# Patient Record
Sex: FEMALE | Race: WHITE | NOT HISPANIC OR LATINO | Employment: FULL TIME | URBAN - METROPOLITAN AREA
[De-identification: names, ages, dates, MRNs, and addresses within clinical notes are randomized per-mention and may not be internally consistent; named-entity substitution may affect disease eponyms.]

---

## 2018-06-17 ENCOUNTER — VIRTUAL VISIT (OUTPATIENT)
Dept: FAMILY MEDICINE | Facility: OTHER | Age: 28
End: 2018-06-17

## 2018-06-18 NOTE — PROGRESS NOTES
"Date:   Clinician: Johnathan Cochran  Clinician NPI: [id number]  Patient: Luba Hill  Patient : 1990  Patient Address: [address] [place] [address]  Patient Phone: [phone]  Visit Protocol: UTI  Patient Summary:  Luba is a 28 year old ( : 1990 ) female who initiated a Visit for a presumed bladder infection. When asked the question \"Please sign me up to receive news, health information and promotions from [employer].\", Luba responded \"No\".    Her symptoms began today and consist of dysuria and hematuria.   Symptom Details   Hematuria: She has seen blood-tinged urine 4 time(s) since the onset of her symptoms. She is certain the blood is not from her vagina.    She denies flank pain, urinary frequency, recent antibiotic use, chills, foul smelling urine, nausea, hesitation, urgency, urinary incontinence, vaginal discharge, abdominal pain, vomiting, loss of appetite, and feeling feverish. Luba has never had kidney stones. She has not been hospitalized, been a patient in a nursing home, or had a catheter in the past two weeks. She denies risk factors for sexually transmitted infections.   Luba has not had a bladder infection in the past.   Luba does not get yeast infections when she takes antibiotics.   She denies pregnancy and denies breastfeeding. She has menstruated in the past month.  MEDICATIONS: [], ALLERGIES: []  Clinician Response:  Dear Luba,  Based on the information you have provided, you likely have a bladder infection, also called acute urinary tract infection (UTI). The blood in your urine appears when the infection causes irritation of the bladder or urine carrying structures.   To treat your infection, I am prescribing:    Nitrofurantoin monohyd/m-cryst (Macrobid) 100 mg oral capsule. Take 1 capsule by mouth every 12 hours for 5 days. Take the medication with food. Continue taking the capsules even if you feel better before all of the medication is " gone. There are no refills with this prescription.  Some women may develop a yeast infection as a side effect of taking antibiotics. If you notice symptoms of a yeast infection, [facility] can help treat that condition as well. Simply log in and complete another Visit, which will cover all of the necessary questions to determine the best treatment for you.   Some people develop allergies to antibiotics. If you notice a new rash, significant swelling, or difficulty breathing, stop the medication immediately and go into a clinic for physical evaluation.   If you become pregnant during this course of treatment, stop taking the medication and contact your primary care provider.   Unless you are allergic to the following over-the-counter medication, I recommend:  phenazopyridine (AZO, Uristat, or store brand) oral tablet to treat your discomfort with urination. Swallow two (2) tablets three times a day for up to 2 days. Take the pills with a full glass of water after a meal.  You will notice that this medication adds an orange/red color to your urine, which may stain fabric. Your contact lenses may also stain if handled after touching the tablets. If your skin or the whites of your eyes develop a yellowish color, it may indicate that your kidneys are not correctly removing the medication. Although this is uncommon, stop using the medication and immediately contact your clinic if this happens.  This is an over-the-counter medication that does not require a prescription.   To help treat your current UTI and prevent future occurrences, remember to:     Drink 8-10, 8-ounce glasses of water daily.    Urinate after sexual intercourse.    Wipe front to back after using the bathroom.     You should visit a clinic for a follow-up visit if your symptoms do not improve in 1-2 days or if you experience another urinary tract infection soon after completing this treatment.   Diagnosis: Acute uncomplicated bladder infection  Diagnosis ICD:  N39.0  Prescription: nitrofurantoin monohyd/m-cryst (Macrobid) 100 mg oral capsule 10 capsule, 5 days supply. Take 1 capsule by mouth every 12 hours for 5 days. Refills: 0, Refill as needed: no, Allow substitutions: yes  Pharmacy: [id number] - [phone] - [address] [place] [address]

## 2018-11-09 ENCOUNTER — VIRTUAL VISIT (OUTPATIENT)
Dept: FAMILY MEDICINE | Facility: OTHER | Age: 28
End: 2018-11-09

## 2018-11-09 NOTE — PROGRESS NOTES
"Date:   Clinician: Johnathan Cochran  Clinician NPI: [id number]  Patient: Luba Hill  Patient : 1990  Patient Address: [address] [place] [id number]  Patient Phone: [phone]  Visit Protocol: Shingles  Patient Summary:  Luba is a 28 year old ( : 1990 ) female who initiated a Visit for suspected Herpes zoster (shingles). When asked the question \"Please sign me up to receive news, health information and promotions from [employer].\", Luba responded \"Yes\".    Images of her skin condition were uploaded.   Her symptoms started 1-3 days ago. The left side of her body is affected. The rash is located on her abdomen and back. The rash is red and includes scaly skin and blisters.   The affected area feels like it burns, painful, itchy, warm to touch, and tender to touch. The symptoms do not interfere with her sleep. Luba experienced pain or unusual sensations in the location of the rash before it appeared.   Symptom details     Blisters: Luba has only a few blisters.    Pain: The pain is mild (between 1-3 on a 10 point pain scale).     Denied symptoms include crusts, dry skin, flaky skin, sores, numbness, drainage, and scabs. Luba does not feel feverish.   Pertinent medical history  Luba has had chickenpox, but has not had shingles in the past.   Ongoing medical conditions were denied.   She denies pregnancy and denies breastfeeding. She has menstruated in the past month.   Luba does not smoke or use smokeless tobacco.   Additional information as reported by the patient (free text): On Wednesday  I had very sharp and localized pain on my back/ribs on the left side which felt like a very deep and large bruise, but there was nothing showing on my skin. I assumed that I had pulled a muscle, or somehow bumped it. On Thursday I woke up and the spot on my back/stomach was even more tender and had sharp shooting pain, but I still did not see any spots/blisters. Now I have " burning/itching blisters/spots from my ribs/back to chest on L side only and it is very sensitive to touch   MEDICATIONS: No current medications, ALLERGIES: NKDA  Clinician Response:  Dear Luba,  Based on the information provided, you have Herpes Zoster (shingles). Shingles is a blistered rash caused by the same virus that causes chickenpox - Varicella Zoster.  Everyone who comes down with shingles has had chickenpox at some time in their life. After recovering from chickenpox, the inactive virus remains in the nerve cells. Shingles develops if the virus becomes active. This reactivation can happen years or even decades later.   Because the virus spreads along a nerve, the rash is painful and appears as a stripe along one side of the body. The rash contains groups of blisters that break, crust over, and resolve within 3-6 weeks. Although rare, it is possible for the pain to last weeks after the rash has completely healed.  Medication information  I am prescribing:   Valacyclovir (Valtrex) 1 gram oral tablet. Take 1 tablet by mouth every 8 hours for 7 days. There are no refills with this prescription.  Medication is used to help speed up the healing process, but may not take your symptoms away completely.  Unless you are allergic to the over-the-counter medication(s) below, I recommend using:   Ibuprofen (Advil or store brand) 200 mg oral tablet. Take 1-3 tablets (200-600 mg) by mouth every 8 hours to control pain. Make sure to take the ibuprofen with food. Do not exceed 2400 mg in 24 hours.  Over-the-counter medications do not require a prescription. Ask the pharmacist if you have any questions.  Self care  Shingles is not spread from one person to another. However, because shingles and chickenpox are caused by the same virus, you could spread chickenpox to someone who has not had the illness or been vaccinated against it. Cover the rash with a loose bandage until all blisters scab over to prevent spreading the  virus to those at risk for getting chickenpox.  Steps you can take to be as comfortable as possible:     Avoid touching the rash    Apply a cool, wet washcloth to your rash for 15 minutes several times a day    Take a lukewarm bath to soothe the skin. Adding colloidal oatmeal can help even more    Choose clothing and bedding made of a breathable material like cotton    Do not use antibiotic creams or ointments unless recommended by a provider     When to seek care  Please make an appointment to be seen in a clinic or go to an urgent care if any of the following occur:     Your rash doesn't improve after 14 days    You develop new symptoms or your symptoms become worse    Symptoms are so severe that you are unable to sleep or do regular activities    You are still experiencing pain one month after your shingles rash has completely healed    You notice symptoms of a skin infection (spreading redness, pain that is not improving, fevers, warmth)     Seek care in an emergency room if you develop a fever, headache and sensitivity to light.   Diagnosis: Herpes zoster (shingles)  Diagnosis ICD: B02.8  Prescription: valacyclovir (Valtrex) 1 gram oral tablet 21 tablet, 7 days supply. Take 1 tablet by mouth every 8 hours for 7 days. Refills: 0, Refill as needed: no, Allow substitutions: yes  Pharmacy: [id number] - [phone] - [address], [place] [address]

## 2019-12-28 ENCOUNTER — VIRTUAL VISIT (OUTPATIENT)
Dept: FAMILY MEDICINE | Facility: OTHER | Age: 29
End: 2019-12-28

## 2020-05-20 ENCOUNTER — NURSE TRIAGE (OUTPATIENT)
Dept: NURSING | Facility: CLINIC | Age: 30
End: 2020-05-20

## 2020-05-20 DIAGNOSIS — Z20.822 EXPOSURE TO COVID-19 VIRUS: Primary | ICD-10-CM

## 2020-05-20 NOTE — TELEPHONE ENCOUNTER
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 Initial exposure approx 6 weeks ago, prior to PPE usage.  Luba reports never having developed symptoms.  Madeline testing ordered.         Patient is calling requesting COVID serologic antibody testing.  NOTE: Serologic testing is a blood test for 'antibodies' which are made at 10-14 days after you have had symptoms of COVID or were exposed and had an asymptomatic infection.  This does NOT test you for 'active' infection or tell you if you are contagious.    Are you a healthcare worker?  Yes  Do you work for [employer]?   No  Do you currently have a cough, fever, body aches, shortness of breath, or difficulty breathing?  No  Have you been exposed to (or come into close contact with) someone who tested POSITIVE for COVID-19 or someone who had a possible case of COVID-19?  Confirmed exposure 42 days ago.  Confirmed exposure > 14 days ago.      The patient was informed: \"Testing is limited each day and it may take time for testing to be available to everyone who has called.  We will be calling you to schedule testing- please confirm the best number to reach you is [phone].\"    Lab order placed per SARS-CoV-2 Serology test Standing Order.      {    Reason for Disposition    COVID-19 Testing, questions about    Protocols used: CORONAVIRUS (COVID-19) EXPOSURE-A- 4.22.20      "

## 2020-05-22 DIAGNOSIS — Z20.822 EXPOSURE TO COVID-19 VIRUS: ICD-10-CM

## 2020-05-22 PROCEDURE — 36415 COLL VENOUS BLD VENIPUNCTURE: CPT | Performed by: EMERGENCY MEDICINE

## 2020-05-22 PROCEDURE — 99000 SPECIMEN HANDLING OFFICE-LAB: CPT | Performed by: EMERGENCY MEDICINE

## 2020-05-22 PROCEDURE — 86769 SARS-COV-2 COVID-19 ANTIBODY: CPT | Mod: 90 | Performed by: EMERGENCY MEDICINE

## 2020-05-23 LAB
COVID-19 SPIKE RBD ABY TITER: NORMAL
COVID-19 SPIKE RBD ABY: NEGATIVE

## 2021-01-03 ENCOUNTER — HEALTH MAINTENANCE LETTER (OUTPATIENT)
Age: 31
End: 2021-01-03

## 2021-10-10 ENCOUNTER — HEALTH MAINTENANCE LETTER (OUTPATIENT)
Age: 31
End: 2021-10-10

## 2022-01-29 ENCOUNTER — HEALTH MAINTENANCE LETTER (OUTPATIENT)
Age: 32
End: 2022-01-29

## 2022-09-18 ENCOUNTER — HEALTH MAINTENANCE LETTER (OUTPATIENT)
Age: 32
End: 2022-09-18

## 2023-05-06 ENCOUNTER — HEALTH MAINTENANCE LETTER (OUTPATIENT)
Age: 33
End: 2023-05-06

## 2024-02-19 LAB
HEPATITIS B SURFACE ANTIGEN (EXTERNAL): NEGATIVE
HIV1+2 AB SERPL QL IA: NEGATIVE
RUBELLA ANTIBODY IGG (EXTERNAL): POSITIVE

## 2024-02-28 ENCOUNTER — PATIENT OUTREACH (OUTPATIENT)
Dept: CARE COORDINATION | Facility: CLINIC | Age: 34
End: 2024-02-28

## 2024-03-13 ENCOUNTER — PATIENT OUTREACH (OUTPATIENT)
Dept: CARE COORDINATION | Facility: CLINIC | Age: 34
End: 2024-03-13

## 2024-05-01 ENCOUNTER — OFFICE VISIT (OUTPATIENT)
Dept: URGENT CARE | Facility: URGENT CARE | Age: 34
End: 2024-05-01
Payer: COMMERCIAL

## 2024-05-01 VITALS
DIASTOLIC BLOOD PRESSURE: 81 MMHG | BODY MASS INDEX: 33.9 KG/M2 | SYSTOLIC BLOOD PRESSURE: 121 MMHG | HEIGHT: 67 IN | RESPIRATION RATE: 16 BRPM | OXYGEN SATURATION: 98 % | TEMPERATURE: 97.9 F | WEIGHT: 216 LBS | HEART RATE: 77 BPM

## 2024-05-01 DIAGNOSIS — Z3A.18 18 WEEKS GESTATION OF PREGNANCY: ICD-10-CM

## 2024-05-01 DIAGNOSIS — J06.9 VIRAL URI WITH COUGH: Primary | ICD-10-CM

## 2024-05-01 PROCEDURE — 99203 OFFICE O/P NEW LOW 30 MIN: CPT | Performed by: NURSE PRACTITIONER

## 2024-05-01 NOTE — PATIENT INSTRUCTIONS
Drink plenty of water.  Tylenol as needed for any discomfort or pain.    Follow-up with ultrasound appointment next week as scheduled.

## 2024-05-01 NOTE — PROGRESS NOTES
"  ICD-10-CM    1. Viral URI with cough  J06.9       Tylenol as needed for discomfort. Rest.  Fluids.  Recheck in 10 days if symptoms have not improved, sooner if they worsen.    Red flag warning signs and when to go to the emergency room discussed.  Reviewed potential adverse reactions to medications.    SUBJECTIVE:   Luba Hill is a 34 year old female presenting with a chief complaint of   Chief Complaint   Patient presents with    Ear Problem     This morning when blowing nose has a loud pop in right ear, now hard to hear out of     Nasal Congestion     X1 week of congestion     Cough    Urgent Care     Negative covid tests and no fever    Sore throat, IUP 18 weeks and 3 days.    Review of systems is negative except for as noted in the HPI.    OBJECTIVE  /81   Pulse 77   Temp 97.9  F (36.6  C) (Temporal)   Resp 16   Ht 1.702 m (5' 7\")   Wt 98 kg (216 lb)   SpO2 98%   BMI 33.83 kg/m        GENERAL: Alert, mild distress  SKIN: skin is clear, no rash or abnormal pigmentation  HEAD: The head is normocephalic.   EYES: The eyes are normal. The conjunctivae and cornea normal.   NECK: The neck is supple and thyroid is normal, no masses; LYMPH NODES: No adenopathy  HENT: Bilateral tympanic membranes appear normal, ear canals are normal, clear rhinorrhea is present, pharynx appears normal  LUNGS: The lung fields are clear to auscultation, no rales, rhonchi, wheezing or retractions  CV: Rhythm is regular. S1 and S2 are normal. No murmurs.  EXTREMITIES: Symmetric extremities no deformities    Eda Medina APRN, CNP  [facility] Provider    The use of Dragon/[employer] dictation services may have been used to construct the content in this note; any grammatical or spelling errors are non-intentional. Please contact the author of this note directly if you are in need of any clarification.       "

## 2024-05-05 ENCOUNTER — HEALTH MAINTENANCE LETTER (OUTPATIENT)
Age: 34
End: 2024-05-05

## 2024-07-03 LAB — TREPONEMA PALLIDUM ANTIBODY (EXTERNAL): NONREACTIVE

## 2024-09-04 LAB — GROUP B STREPTOCOCCUS (EXTERNAL): NEGATIVE

## 2024-10-02 ENCOUNTER — ANESTHESIA EVENT (OUTPATIENT)
Dept: OBGYN | Facility: CLINIC | Age: 34
End: 2024-10-02
Payer: COMMERCIAL

## 2024-10-02 ENCOUNTER — HOSPITAL ENCOUNTER (INPATIENT)
Facility: CLINIC | Age: 34
LOS: 2 days | Discharge: HOME | End: 2024-10-04
Attending: SPECIALIST | Admitting: SPECIALIST
Payer: COMMERCIAL

## 2024-10-02 ENCOUNTER — ANESTHESIA (OUTPATIENT)
Dept: OBGYN | Facility: CLINIC | Age: 34
End: 2024-10-02
Payer: COMMERCIAL

## 2024-10-02 ENCOUNTER — HOSPITAL ENCOUNTER (OUTPATIENT)
Facility: CLINIC | Age: 34
Discharge: HOME | End: 2024-10-02
Attending: SPECIALIST | Admitting: SPECIALIST
Payer: COMMERCIAL

## 2024-10-02 VITALS — TEMPERATURE: 97.2 F | RESPIRATION RATE: 16 BRPM | SYSTOLIC BLOOD PRESSURE: 125 MMHG | DIASTOLIC BLOOD PRESSURE: 62 MMHG

## 2024-10-02 PROBLEM — Z36.89 ENCOUNTER FOR TRIAGE IN PREGNANT PATIENT: Status: ACTIVE | Noted: 2024-10-02

## 2024-10-02 LAB
ABO/RH(D): NORMAL
ANTIBODY SCREEN: NEGATIVE
HGB BLD-MCNC: 11.5 G/DL (ref 11.7–15.7)
SPECIMEN EXPIRATION DATE: NORMAL
T PALLIDUM AB SER QL: NONREACTIVE

## 2024-10-02 PROCEDURE — 120N000001 HC R&B MED SURG/OB

## 2024-10-02 PROCEDURE — 250N000009 HC RX 250: Performed by: SPECIALIST

## 2024-10-02 PROCEDURE — 250N000011 HC RX IP 250 OP 636: Performed by: ANESTHESIOLOGY

## 2024-10-02 PROCEDURE — 10907ZC DRAINAGE OF AMNIOTIC FLUID, THERAPEUTIC FROM PRODUCTS OF CONCEPTION, VIA NATURAL OR ARTIFICIAL OPENING: ICD-10-PCS | Performed by: SPECIALIST

## 2024-10-02 PROCEDURE — 722N000001 HC LABOR CARE VAGINAL DELIVERY SINGLE

## 2024-10-02 PROCEDURE — 36415 COLL VENOUS BLD VENIPUNCTURE: CPT | Performed by: SPECIALIST

## 2024-10-02 PROCEDURE — 0UQMXZZ REPAIR VULVA, EXTERNAL APPROACH: ICD-10-PCS | Performed by: SPECIALIST

## 2024-10-02 PROCEDURE — 258N000003 HC RX IP 258 OP 636: Performed by: SPECIALIST

## 2024-10-02 PROCEDURE — 00HU33Z INSERTION OF INFUSION DEVICE INTO SPINAL CANAL, PERCUTANEOUS APPROACH: ICD-10-PCS | Performed by: ANESTHESIOLOGY

## 2024-10-02 PROCEDURE — 250N000013 HC RX MED GY IP 250 OP 250 PS 637: Performed by: SPECIALIST

## 2024-10-02 PROCEDURE — G0463 HOSPITAL OUTPT CLINIC VISIT: HCPCS

## 2024-10-02 PROCEDURE — 59400 OBSTETRICAL CARE: CPT | Performed by: ANESTHESIOLOGY

## 2024-10-02 PROCEDURE — 3E0R3BZ INTRODUCTION OF ANESTHETIC AGENT INTO SPINAL CANAL, PERCUTANEOUS APPROACH: ICD-10-PCS | Performed by: ANESTHESIOLOGY

## 2024-10-02 PROCEDURE — 85018 HEMOGLOBIN: CPT | Performed by: SPECIALIST

## 2024-10-02 PROCEDURE — 86900 BLOOD TYPING SEROLOGIC ABO: CPT | Performed by: SPECIALIST

## 2024-10-02 PROCEDURE — 86780 TREPONEMA PALLIDUM: CPT | Performed by: SPECIALIST

## 2024-10-02 PROCEDURE — 370N000003 HC ANESTHESIA WARD SERVICE: Performed by: ANESTHESIOLOGY

## 2024-10-02 PROCEDURE — 86850 RBC ANTIBODY SCREEN: CPT | Performed by: SPECIALIST

## 2024-10-02 RX ORDER — NALOXONE HYDROCHLORIDE 0.4 MG/ML
0.4 INJECTION, SOLUTION INTRAMUSCULAR; INTRAVENOUS; SUBCUTANEOUS
Status: DISCONTINUED | OUTPATIENT
Start: 2024-10-02 | End: 2024-10-02 | Stop reason: HOSPADM

## 2024-10-02 RX ORDER — ASPIRIN 81 MG/1
81 TABLET ORAL DAILY
Status: ON HOLD | COMMUNITY
End: 2024-10-04

## 2024-10-02 RX ORDER — LOPERAMIDE HYDROCHLORIDE 2 MG/1
2 CAPSULE ORAL
Status: DISCONTINUED | OUTPATIENT
Start: 2024-10-02 | End: 2024-10-04 | Stop reason: HOSPADM

## 2024-10-02 RX ORDER — ONDANSETRON 2 MG/ML
4 INJECTION INTRAMUSCULAR; INTRAVENOUS EVERY 6 HOURS PRN
Status: DISCONTINUED | OUTPATIENT
Start: 2024-10-02 | End: 2024-10-02 | Stop reason: HOSPADM

## 2024-10-02 RX ORDER — CITRIC ACID/SODIUM CITRATE 334-500MG
30 SOLUTION, ORAL ORAL
Status: DISCONTINUED | OUTPATIENT
Start: 2024-10-02 | End: 2024-10-02 | Stop reason: HOSPADM

## 2024-10-02 RX ORDER — HYDROXYZINE HYDROCHLORIDE 50 MG/1
50 TABLET, FILM COATED ORAL ONCE
Status: COMPLETED | OUTPATIENT
Start: 2024-10-02 | End: 2024-10-02

## 2024-10-02 RX ORDER — MISOPROSTOL 200 UG/1
400 TABLET ORAL
Status: DISCONTINUED | OUTPATIENT
Start: 2024-10-02 | End: 2024-10-04 | Stop reason: HOSPADM

## 2024-10-02 RX ORDER — TRANEXAMIC ACID 10 MG/ML
1 INJECTION, SOLUTION INTRAVENOUS EVERY 30 MIN PRN
Status: DISCONTINUED | OUTPATIENT
Start: 2024-10-02 | End: 2024-10-04 | Stop reason: HOSPADM

## 2024-10-02 RX ORDER — MISOPROSTOL 200 UG/1
400 TABLET ORAL
Status: DISCONTINUED | OUTPATIENT
Start: 2024-10-02 | End: 2024-10-02 | Stop reason: HOSPADM

## 2024-10-02 RX ORDER — METHYLERGONOVINE MALEATE 0.2 MG/ML
200 INJECTION INTRAVENOUS
Status: DISCONTINUED | OUTPATIENT
Start: 2024-10-02 | End: 2024-10-02 | Stop reason: HOSPADM

## 2024-10-02 RX ORDER — ROPIVACAINE HYDROCHLORIDE 2 MG/ML
10 INJECTION, SOLUTION EPIDURAL; INFILTRATION; PERINEURAL ONCE
Status: DISCONTINUED | OUTPATIENT
Start: 2024-10-02 | End: 2024-10-02 | Stop reason: HOSPADM

## 2024-10-02 RX ORDER — METHYLERGONOVINE MALEATE 0.2 MG/ML
200 INJECTION INTRAVENOUS
Status: DISCONTINUED | OUTPATIENT
Start: 2024-10-02 | End: 2024-10-04 | Stop reason: HOSPADM

## 2024-10-02 RX ORDER — IBUPROFEN 400 MG/1
800 TABLET, FILM COATED ORAL
Status: COMPLETED | OUTPATIENT
Start: 2024-10-02 | End: 2024-10-03

## 2024-10-02 RX ORDER — OXYTOCIN/0.9 % SODIUM CHLORIDE 30/500 ML
340 PLASTIC BAG, INJECTION (ML) INTRAVENOUS CONTINUOUS PRN
Status: DISCONTINUED | OUTPATIENT
Start: 2024-10-02 | End: 2024-10-04 | Stop reason: HOSPADM

## 2024-10-02 RX ORDER — HYDROCORTISONE 25 MG/G
CREAM TOPICAL 3 TIMES DAILY PRN
Status: DISCONTINUED | OUTPATIENT
Start: 2024-10-02 | End: 2024-10-04 | Stop reason: HOSPADM

## 2024-10-02 RX ORDER — METOCLOPRAMIDE 10 MG/1
10 TABLET ORAL EVERY 6 HOURS PRN
Status: DISCONTINUED | OUTPATIENT
Start: 2024-10-02 | End: 2024-10-02 | Stop reason: HOSPADM

## 2024-10-02 RX ORDER — ONDANSETRON 4 MG/1
4 TABLET, ORALLY DISINTEGRATING ORAL EVERY 6 HOURS PRN
Status: DISCONTINUED | OUTPATIENT
Start: 2024-10-02 | End: 2024-10-02 | Stop reason: HOSPADM

## 2024-10-02 RX ORDER — LOPERAMIDE HYDROCHLORIDE 2 MG/1
2 CAPSULE ORAL
Status: DISCONTINUED | OUTPATIENT
Start: 2024-10-02 | End: 2024-10-02 | Stop reason: HOSPADM

## 2024-10-02 RX ORDER — MISOPROSTOL 200 UG/1
800 TABLET ORAL
Status: DISCONTINUED | OUTPATIENT
Start: 2024-10-02 | End: 2024-10-04 | Stop reason: HOSPADM

## 2024-10-02 RX ORDER — OXYTOCIN/0.9 % SODIUM CHLORIDE 30/500 ML
1-24 PLASTIC BAG, INJECTION (ML) INTRAVENOUS CONTINUOUS
Status: DISCONTINUED | OUTPATIENT
Start: 2024-10-02 | End: 2024-10-02 | Stop reason: HOSPADM

## 2024-10-02 RX ORDER — LIDOCAINE 40 MG/G
CREAM TOPICAL
Status: DISCONTINUED | OUTPATIENT
Start: 2024-10-02 | End: 2024-10-02 | Stop reason: HOSPADM

## 2024-10-02 RX ORDER — LOPERAMIDE HYDROCHLORIDE 2 MG/1
4 CAPSULE ORAL
Status: DISCONTINUED | OUTPATIENT
Start: 2024-10-02 | End: 2024-10-04 | Stop reason: HOSPADM

## 2024-10-02 RX ORDER — KETOROLAC TROMETHAMINE 30 MG/ML
30 INJECTION, SOLUTION INTRAMUSCULAR; INTRAVENOUS
Status: COMPLETED | OUTPATIENT
Start: 2024-10-02 | End: 2024-10-03

## 2024-10-02 RX ORDER — PROCHLORPERAZINE MALEATE 5 MG/1
10 TABLET ORAL EVERY 6 HOURS PRN
Status: DISCONTINUED | OUTPATIENT
Start: 2024-10-02 | End: 2024-10-02 | Stop reason: HOSPADM

## 2024-10-02 RX ORDER — OXYTOCIN 10 [USP'U]/ML
10 INJECTION, SOLUTION INTRAMUSCULAR; INTRAVENOUS
Status: DISCONTINUED | OUTPATIENT
Start: 2024-10-02 | End: 2024-10-04 | Stop reason: HOSPADM

## 2024-10-02 RX ORDER — CARBOPROST TROMETHAMINE 250 UG/ML
250 INJECTION, SOLUTION INTRAMUSCULAR
Status: DISCONTINUED | OUTPATIENT
Start: 2024-10-02 | End: 2024-10-02 | Stop reason: HOSPADM

## 2024-10-02 RX ORDER — HYDROXYZINE HYDROCHLORIDE 50 MG/1
TABLET, FILM COATED ORAL
Status: COMPLETED
Start: 2024-10-02 | End: 2024-10-02

## 2024-10-02 RX ORDER — ROPIVACAINE HYDROCHLORIDE 2 MG/ML
INJECTION, SOLUTION EPIDURAL; INFILTRATION; PERINEURAL
Status: COMPLETED | OUTPATIENT
Start: 2024-10-02 | End: 2024-10-02

## 2024-10-02 RX ORDER — MISOPROSTOL 200 UG/1
800 TABLET ORAL
Status: DISCONTINUED | OUTPATIENT
Start: 2024-10-02 | End: 2024-10-02 | Stop reason: HOSPADM

## 2024-10-02 RX ORDER — TRANEXAMIC ACID 10 MG/ML
1 INJECTION, SOLUTION INTRAVENOUS EVERY 30 MIN PRN
Status: DISCONTINUED | OUTPATIENT
Start: 2024-10-02 | End: 2024-10-02 | Stop reason: HOSPADM

## 2024-10-02 RX ORDER — IBUPROFEN 400 MG/1
800 TABLET, FILM COATED ORAL EVERY 6 HOURS PRN
Status: DISCONTINUED | OUTPATIENT
Start: 2024-10-02 | End: 2024-10-04 | Stop reason: HOSPADM

## 2024-10-02 RX ORDER — SODIUM CHLORIDE, SODIUM LACTATE, POTASSIUM CHLORIDE, CALCIUM CHLORIDE 600; 310; 30; 20 MG/100ML; MG/100ML; MG/100ML; MG/100ML
INJECTION, SOLUTION INTRAVENOUS CONTINUOUS
Status: DISCONTINUED | OUTPATIENT
Start: 2024-10-02 | End: 2024-10-02 | Stop reason: HOSPADM

## 2024-10-02 RX ORDER — OXYTOCIN/0.9 % SODIUM CHLORIDE 30/500 ML
100-340 PLASTIC BAG, INJECTION (ML) INTRAVENOUS CONTINUOUS PRN
Status: DISCONTINUED | OUTPATIENT
Start: 2024-10-02 | End: 2024-10-04 | Stop reason: HOSPADM

## 2024-10-02 RX ORDER — LOPERAMIDE HYDROCHLORIDE 2 MG/1
4 CAPSULE ORAL
Status: DISCONTINUED | OUTPATIENT
Start: 2024-10-02 | End: 2024-10-02 | Stop reason: HOSPADM

## 2024-10-02 RX ORDER — NALOXONE HYDROCHLORIDE 0.4 MG/ML
0.2 INJECTION, SOLUTION INTRAMUSCULAR; INTRAVENOUS; SUBCUTANEOUS
Status: DISCONTINUED | OUTPATIENT
Start: 2024-10-02 | End: 2024-10-02 | Stop reason: HOSPADM

## 2024-10-02 RX ORDER — CARBOPROST TROMETHAMINE 250 UG/ML
250 INJECTION, SOLUTION INTRAMUSCULAR
Status: DISCONTINUED | OUTPATIENT
Start: 2024-10-02 | End: 2024-10-04 | Stop reason: HOSPADM

## 2024-10-02 RX ORDER — NALBUPHINE HYDROCHLORIDE 10 MG/ML
2.5-5 INJECTION INTRAMUSCULAR; INTRAVENOUS; SUBCUTANEOUS EVERY 6 HOURS PRN
Status: DISCONTINUED | OUTPATIENT
Start: 2024-10-02 | End: 2024-10-04 | Stop reason: HOSPADM

## 2024-10-02 RX ORDER — METOCLOPRAMIDE HYDROCHLORIDE 5 MG/ML
10 INJECTION INTRAMUSCULAR; INTRAVENOUS EVERY 6 HOURS PRN
Status: DISCONTINUED | OUTPATIENT
Start: 2024-10-02 | End: 2024-10-02 | Stop reason: HOSPADM

## 2024-10-02 RX ORDER — MODIFIED LANOLIN
OINTMENT (GRAM) TOPICAL
Status: DISCONTINUED | OUTPATIENT
Start: 2024-10-02 | End: 2024-10-04 | Stop reason: HOSPADM

## 2024-10-02 RX ORDER — EPHEDRINE SULFATE 50 MG/ML
5 INJECTION, SOLUTION INTRAMUSCULAR; INTRAVENOUS; SUBCUTANEOUS
Status: DISCONTINUED | OUTPATIENT
Start: 2024-10-02 | End: 2024-10-02 | Stop reason: HOSPADM

## 2024-10-02 RX ORDER — DOCUSATE SODIUM 100 MG/1
100 CAPSULE, LIQUID FILLED ORAL DAILY
Status: DISCONTINUED | OUTPATIENT
Start: 2024-10-03 | End: 2024-10-04 | Stop reason: HOSPADM

## 2024-10-02 RX ORDER — BISACODYL 10 MG
10 SUPPOSITORY, RECTAL RECTAL DAILY PRN
Status: DISCONTINUED | OUTPATIENT
Start: 2024-10-02 | End: 2024-10-04 | Stop reason: HOSPADM

## 2024-10-02 RX ORDER — OXYTOCIN 10 [USP'U]/ML
10 INJECTION, SOLUTION INTRAMUSCULAR; INTRAVENOUS
Status: DISCONTINUED | OUTPATIENT
Start: 2024-10-02 | End: 2024-10-02 | Stop reason: HOSPADM

## 2024-10-02 RX ORDER — ACETAMINOPHEN 325 MG/1
650 TABLET ORAL EVERY 4 HOURS PRN
Status: DISCONTINUED | OUTPATIENT
Start: 2024-10-02 | End: 2024-10-04 | Stop reason: HOSPADM

## 2024-10-02 RX ORDER — OXYTOCIN/0.9 % SODIUM CHLORIDE 30/500 ML
340 PLASTIC BAG, INJECTION (ML) INTRAVENOUS CONTINUOUS PRN
Status: DISCONTINUED | OUTPATIENT
Start: 2024-10-02 | End: 2024-10-02 | Stop reason: HOSPADM

## 2024-10-02 RX ADMIN — HYDROXYZINE HYDROCHLORIDE 50 MG: 50 TABLET, FILM COATED ORAL at 08:36

## 2024-10-02 RX ADMIN — SODIUM CHLORIDE, POTASSIUM CHLORIDE, SODIUM LACTATE AND CALCIUM CHLORIDE 100 ML/HR: 600; 310; 30; 20 INJECTION, SOLUTION INTRAVENOUS at 14:43

## 2024-10-02 RX ADMIN — Medication 2 MILLI-UNITS/MIN: at 18:08

## 2024-10-02 RX ADMIN — ROPIVACAINE HYDROCHLORIDE 10 ML: 2 INJECTION, SOLUTION EPIDURAL; INFILTRATION at 17:15

## 2024-10-02 RX ADMIN — SODIUM CHLORIDE, POTASSIUM CHLORIDE, SODIUM LACTATE AND CALCIUM CHLORIDE: 600; 310; 30; 20 INJECTION, SOLUTION INTRAVENOUS at 17:35

## 2024-10-02 RX ADMIN — Medication: at 17:12

## 2024-10-02 ASSESSMENT — ACTIVITIES OF DAILY LIVING (ADL)
ADLS_ACUITY_SCORE: 18
ADLS_ACUITY_SCORE: 19
ADLS_ACUITY_SCORE: 18
ADLS_ACUITY_SCORE: 19
ADLS_ACUITY_SCORE: 18
ADLS_ACUITY_SCORE: 19
ADLS_ACUITY_SCORE: 18
ADLS_ACUITY_SCORE: 18
ADLS_ACUITY_SCORE: 35
ADLS_ACUITY_SCORE: 18

## 2024-10-02 NOTE — PROVIDER NOTIFICATION
10/02/24 0805   Provider Notification   Provider Name/Title Dr Tavarez   Method of Notification Electronic Page   Request Evaluate - Remote   Notification Reason SVE;Status Update     Phone call to Dr Tavarez, discussed maternal/fetal status.  Discussed ctx, FHR, vitals and SVE.  Orders given for vistaril for sleep and to discharge home.

## 2024-10-02 NOTE — ANESTHESIA PROCEDURE NOTES
"Epidural catheter Procedure Note    Pre-Procedure   Staff -        Anesthesiologist:  Shoaib Benito MD       Performed By: anesthesiologist       Location: OB       Pre-Anesthestic Checklist: patient identified, IV checked, risks and benefits discussed, informed consent, monitors and equipment checked, pre-op evaluation and at physician/surgeon's request  Timeout:       Correct Patient: Yes        Correct Procedure: Yes        Correct Site: Yes        Correct Position: Yes   Procedure Documentation  Procedure: epidural catheter       Patient Position: sitting       Patient Prep/Sterile Barriers: sterile gloves, mask, patient draped       Skin prep: Betadine and DuraPrep       Local skin infiltrated with mL of 1% lidocaine.        Insertion Site: L2-3. (midline approach).       Technique: LORT saline        ADINA at 5 cm.       Needle Type: Mobivoxy needle       Needle Gauge: 17.        Needle Length (Inches): 5        Catheter: 19 G.          Catheter threaded easily.         5 cm epidural space.         Threaded 10 cm at skin.         # of attempts: 1 and  # of redirects:          : 0.    Assessment/Narrative         Paresthesias: No.       Test dose of 3 mL lidocaine 1.5% w/ 1:200,000 epinephrine at.         Test dose negative, 3 minutes after injection, for signs of intravascular, subdural, or intrathecal injection.       Insertion/Infusion Method: LORT saline       No aspiration negative for Heme or CSF via Epidural Catheter.    Medication(s) Administered   0.2% Ropivacaine (Epidural) - EPIDURAL   10 mL - 10/2/2024 5:15:00 PM   Comments:  Pt tolerated well.    Taped sterile and secure.   FHTs stable post-procedure.   No complications.       FOR [facility] ([facility] ONLY:   Pain Team Contact information: please page the Pain Team Via [employer]. Search \"Pain\". During daytime hours, please page the attending first. At night please page the resident first.      "

## 2024-10-02 NOTE — PLAN OF CARE
1350.  Primip, 40 3/7 gestation, here from home after having been seen in clinic this morning by  and had cervical change.  External monitors applied with pts consent, health history verified.   Pt rates contractions 2/10, and irregular at this time.    1415.  Page to  for orders.  1420.   at bedside, SVE performed, 4/80/0 station, SROM with exam, clear fluid noted.     1510.  Report to Luba Harris RN to assume pt cares

## 2024-10-02 NOTE — PROGRESS NOTES
Data: Patient presented to [facility]: 10/2/2024  6:51 AM.  Reason for maternal/fetal assessment is uterine contractions. Patient reports contractions every 5 minutes and increasing in intensity. Patient denies leaking of vaginal fluid/rupture of membranes, vaginal bleeding. Patient reports fetal movement is normal. Patient is a 40w3d .  Prenatal record reviewed. Pregnancy has been uncomplicated.    Vital signs wnl. Support person is present.     Action: Verbal consent for EFM. Triage assessment completed.     Handoff report given to MARY ANN Osei

## 2024-10-02 NOTE — H&P
"OB Admit H&P  Prenatal record reviewed   Primary Dr Emerald Paky is a 34 yo G1 with EDC 2024 therefore 40  3/7 weeks who was here in prodromal labor then went to office and then home and had changed her cervix. She is still not uncomfortable at this point. Pregnancy unremarkable. GBBS was negative.    Past medical Hx hx of pyloric stenosis   Hx of PCOS    Had vaccines flu, tdap and RSV    PE /71   Temp 99  F (37.2  C) (Temporal)   Ht 1.702 m (5' 7\")   BMI 33.83 kg/m     Patient sitting up in bed in no distress  Cv RRR  Lungs clear  Abdomen gravid  SVE 4cm/80/0  AROM with exam  FHT's cat 1   Uc's irregular    A/P Primigravida in early labor  now ruptured which should augment her.  Admission orders in will rest for now. Anticipate .     Electronically signed by Juliana Tavarez MD 2:38 PM 10/2/2024      "

## 2024-10-02 NOTE — PROVIDER NOTIFICATION
10/02/24 0545   Provider Notification   Provider Name/Title MD Tavarez   Method of Notification Phone   Request Evaluate - Remote   Notification Reason Labor Status;Uterine Activity;Pain;Status Update;SVE;Other (Comment)     MD Tavarez updated on pt condition- updated that pt just got epidural and is comfortable, smith catheter placed, and SVE is currently 4.5/50/-1. Per MD, ok to place and start Pitocin orders. RN is not to exceed 6 mU/hr of Pitocin. Start at 2 mU/hr and see if pt kicks in to a regular contraction pattern. If so, leave the Pitocin at 2mU/hr. Will continue to updated MD with any changes. She is currently at [facility].

## 2024-10-02 NOTE — ANESTHESIA PREPROCEDURE EVALUATION
"Anesthesia Pre-Procedure Evaluation    Patient: Luba Hill   MRN: [id number] : 1990        Procedure :           Past Medical History:   Diagnosis Date    PCOS (polycystic ovarian syndrome)       Past Surgical History:   Procedure Laterality Date    US PYLORUS PEDIATRIC        No Known Allergies   Social History     Tobacco Use    Smoking status: Never    Smokeless tobacco: Never   Substance Use Topics    Alcohol use: Not Currently      Wt Readings from Last 1 Encounters:   24 98 kg (216 lb)        Anesthesia Evaluation            ROS/MED HX  ENT/Pulmonary:    (-) asthma   Neurologic:  - neg neurologic ROS     Cardiovascular:    (-) PIH   METS/Exercise Tolerance:     Hematologic:     (+)  no anticoagulation therapy, no coagulopathy,             Musculoskeletal:       GI/Hepatic:     (+) GERD,                   Renal/Genitourinary:       Endo:       Psychiatric/Substance Use:       Infectious Disease:       Malignancy:       Other:            Physical Exam    Airway        Mallampati: II   TM distance: > 3 FB   Neck ROM: full     Respiratory Devices and Support         Dental  no notable dental history         Cardiovascular   cardiovascular exam normal          Pulmonary   pulmonary exam normal                OUTSIDE LABS:  CBC:   Lab Results   Component Value Date    HGB 11.5 (L) 10/02/2024     BMP: No results found for: \"NA\", \"POTASSIUM\", \"CHLORIDE\", \"CO2\", \"BUN\", \"CR\", \"GLC\"  COAGS: No results found for: \"PTT\", \"INR\", \"FIBR\"  POC: No results found for: \"BGM\", \"HCG\", \"HCGS\"  HEPATIC: No results found for: \"ALBUMIN\", \"PROTTOTAL\", \"ALT\", \"AST\", \"GGT\", \"ALKPHOS\", \"BILITOTAL\", \"BILIDIRECT\", \"LINNETTE\"  OTHER: No results found for: \"PH\", \"LACT\", \"A1C\", \"SYLVESTER\", \"PHOS\", \"MAG\", \"LIPASE\", \"AMYLASE\", \"TSH\", \"T4\", \"T3\", \"CRP\", \"SED\"    Anesthesia Plan    ASA Status:  2       Anesthesia Type: Epidural.              Consents    Anesthesia Plan(s) and associated risks, benefits, and realistic alternatives " discussed. Questions answered and patient/representative(s) expressed understanding.     - Discussed:     - Discussed with:  Patient            Postoperative Care            Comments:    Other Comments: Orders to manage the epidural infusion have been entered, and through coordination with the nurse, we will continute to manage and monitor the patient's labor epidural.  We will continuously be available to adjust as needed thruout the entire L&D process.            Shoaib Benito MD    I have reviewed the pertinent notes and labs in the chart from the past 30 days and (re)examined the patient.  Any updates or changes from those notes are reflected in this note.             # Drug Induced Platelet Defect: home medication list includes an antiplatelet medication

## 2024-10-02 NOTE — PLAN OF CARE
Discharge teaching given to pt and spouse.  Pt and spouse verbalized understanding.  Pt gathered belongings and discharged ambulatory.  Pt will follow up with clinic this morning.

## 2024-10-03 PROCEDURE — 250N000013 HC RX MED GY IP 250 OP 250 PS 637: Performed by: SPECIALIST

## 2024-10-03 PROCEDURE — 120N000012 HC R&B POSTPARTUM

## 2024-10-03 RX ADMIN — ACETAMINOPHEN 650 MG: 325 TABLET ORAL at 13:25

## 2024-10-03 RX ADMIN — IBUPROFEN 800 MG: 400 TABLET, FILM COATED ORAL at 18:56

## 2024-10-03 RX ADMIN — ACETAMINOPHEN 650 MG: 325 TABLET ORAL at 04:00

## 2024-10-03 RX ADMIN — ACETAMINOPHEN 650 MG: 325 TABLET ORAL at 09:00

## 2024-10-03 RX ADMIN — ACETAMINOPHEN 650 MG: 325 TABLET ORAL at 18:56

## 2024-10-03 RX ADMIN — ACETAMINOPHEN 650 MG: 325 TABLET ORAL at 00:11

## 2024-10-03 RX ADMIN — IBUPROFEN 800 MG: 400 TABLET, FILM COATED ORAL at 06:31

## 2024-10-03 RX ADMIN — DOCUSATE SODIUM 100 MG: 100 CAPSULE, LIQUID FILLED ORAL at 09:00

## 2024-10-03 RX ADMIN — IBUPROFEN 800 MG: 400 TABLET, FILM COATED ORAL at 00:11

## 2024-10-03 RX ADMIN — IBUPROFEN 800 MG: 400 TABLET, FILM COATED ORAL at 13:25

## 2024-10-03 ASSESSMENT — ACTIVITIES OF DAILY LIVING (ADL)
ADLS_ACUITY_SCORE: 18
ADLS_ACUITY_SCORE: 19
ADLS_ACUITY_SCORE: 18
ADLS_ACUITY_SCORE: 19
ADLS_ACUITY_SCORE: 18

## 2024-10-03 NOTE — PROVIDER NOTIFICATION
10/02/24 2009   Provider Notification   Provider Name/Title Dr. Tavarez   Method of Notification Electronic Page;Phone   Request Evaluate - Remote   Notification Reason SVE;Status Update     [facility] 231 A.M.  Hi Dr. Tavarez, SVE was 8/90/0, lots of bloody show. Thanks, Adali [phone]  Per MD call to request she come sooner if necessary, she is currently at [facility].

## 2024-10-03 NOTE — PROGRESS NOTES
Postpartum progress note  PPD#1 s/p     S: Feeling well.  Minimal pain.  Ambulating and voiding without difficulty.  Lochia is appropriate.  Working on breastfeeding, latch seems pretty good but baby a little bit sleepy.    O:  Vitals:    10/03/24 0150 10/03/24 0235 10/03/24 0634 10/03/24 0829   BP: 119/56 114/68 116/68 105/59   BP Location:  Left arm Right arm Left arm   Patient Position:  Semi-Sanchez's Semi-Sanchez's    Cuff Size:  Adult Regular Adult Regular    Pulse:    76   Resp: 16 16 16 16   Temp:  98.3  F (36.8  C) 97.7  F (36.5  C) 98.2  F (36.8  C)   TempSrc:  Oral Oral Oral   SpO2:       Height:         Gen: NAD  Resp: No increased work of breathing  Abd: soft, nontender  Ext: warm, well-perfused    Hemoglobin   Date Value Ref Range Status   10/02/2024 11.5 (L) 11.7 - 15.7 g/dL Final     A/P: 35 y/o G1 PPD#1 s/p , doing well postpartum  PPD#1  -Routine care    2. Female infant  -Working on breastfeeding    3. Rh positive/Rubella immune/S/p tdap    4. BCM  -To be discussed at postpartum visit    5. Dispo  -Inpatient, anticipate discharge home tomorrow    Electronically signed by:  Maryan Corbin  [facility] Office  Pager: [phone]  October 3, 2024

## 2024-10-03 NOTE — PROGRESS NOTES
"OB Progress    Patient fairly comfortable with epidural. She went from 4-8 fairly quickly  /75   Temp 98.1  F (36.7  C) (Temporal)   Resp 16   Ht 1.702 m (5' 7\")   SpO2 98%   BMI 33.83 kg/m    FHT's cat 1 no decels  Uc's Q2 on 2 mu pitocin  SVE anterior lip/c/0   OP position    A/P  Patient making progress Not ready to push will try various positions and recheck in one hour.    Electronically signed by Juliana Tavarez MD 9:30 PM 10/2/2024    "

## 2024-10-03 NOTE — LACTATION NOTE
Routine visit. Baby Latched well on the right breast and nursed for 25 minutes.  Nipple round and elongated post feeding.  No further questions at this time. Will follow as needed. Emani Hernandez BSN, RN, PHN, RNC-MNN, IBCLC

## 2024-10-03 NOTE — L&D DELIVERY NOTE
OB Vaginal Delivery Note    Luba Hill MRN# [id number]   Age: 34 year old YOB: 1990       GA: 40w3d  GP:   Labor Complications: None   EBL:   mL  Delivery QBL:    Delivery Type: Vaginal, Spontaneous   ROM to Delivery Time: (Delivered) Hours: 8 Minutes: 56   Weight:     1 Minute 5 Minute 10 Minute   Apgar Totals:            BO ELLSWORTH;CASE HINKLE     Delivery Details:  Luba Hill, a 34 year old  female delivered a viable infant with apgars of   and  . Patient was fully dilated and pushing after   hours   minutes in active labor. Delivery was via vaginal, spontaneous  to a sterile field under epidural  anesthesia. Infant delivered in vertex  right  occiput  anterior  position. Anterior and posterior shoulders delivered without difficulty. The cord was clamped, cut twice and   were noted. Cord blood was obtained in routine fashion with the following disposition:  .      Cord complications: none   Placenta delivered at 10/2/2024 11:23 PM . Placental disposition was Hospital disposal . Fundal massage performed and fundus found to be firm.     Episiotomy: none    Perineum, vagina, cervix were inspected, and the following lacerations were noted:   Perineal lacerations: none     labial laceration: right           Any lacerations were repaired in the usual fashion using 3-0 chromic and interrupted sutures.    Excellent hemostasis was noted. Needle count correct. Infant and patient in delivery room in good and stable condition.        Beverly Hill-Luba [[id number]]      Labor Events     labor?: No   steroids: None  Labor Type: Augmentation, AROM  Predominate monitoring during 1st stage: continuous electronic fetal monitoring     Antibiotics received during labor?: No       Rupture date/time: 10/2/24 1425   Rupture type: Spontaneous Rupture of Membranes  Fluid color: Clear     Augmentation: AROM, Oxytocin       Delivery/Placenta Date and Time       Delivery Date: 10/2/24 Delivery Time: 11:21 PM   Placenta Date/Time: 10/2/2024 11:23 PM  Oxytocin given at the time of delivery: after delivery of placenta  Delivering clinician: Juliana Tavarez MD   Other personnel present at delivery:  Provider Role   Josselyn Scott RN Roehl, Jocelyn J, RN              Vaginal Counts       Initial count performed by 2 team members:  Two Team Members   Daysi Scott         Roberts Suture Needles Sponges (RETIRED) Instruments   Initial counts 2 0 5    Added to count 0 2 0    Relief counts 0 0 0    Final counts               Placed during labor Accounted for at the end of labor   FSE NA    IUPC NA    Cervidil NA                              Cord      Cord Complications: None               Stem cell collection?: No           Labor Events and Shoulder Dystocia    Fetal Tracing Prior to Delivery: Category 1  Shoulder dystocia present?: Neg       Delivery (Maternal) (Provider to Complete) ([id number])    Episiotomy: None  Perineal lacerations: None      Labial laceration: right Repaired?: Yes   Repair suture: 3-0 Chromic  Genital tract inspection done: Pos       Blood Loss  Mother: Antony Hill #[id number]     Start of Mother's Information      Delivery Blood Loss  10/02/24 1121 - 10/02/24 2346      None                 End of Mother's Information  Mother: Antony Hill P #[id number]                Delivery - Provider to Complete ([id number])    Delivering clinician: Juliana Tavarez MD  Delivery Type (Choose the 1 that will go to the Birth History): Vaginal, Spontaneous                         Other personnel:  Provider Role   Josselyn Scott RN Roehl, Jocelyn J, RN                     Placenta    Date/Time: 10/2/2024 11:23 PM  Removal: Spontaneous  Disposition: Hospital disposal             Anesthesia    Method: Epidural  Cervical dilation at placement: 4-7                    Presentation and Position    Presentation: Vertex    Position: Right  Occiput Anterior                     Juliana Tavarez MD, MD  Electronically signed by Juliana Tavarez MD 11:46 PM 10/2/2024

## 2024-10-03 NOTE — PLAN OF CARE
Goal Outcome Evaluation:  VSS Pt using Ibuprofen and tylenol for pain control. Up independently in the room. Voiding in good amounts. Breastfeeding on demand, latching well.

## 2024-10-03 NOTE — PLAN OF CARE
Goal Outcome Evaluation:      Plan of Care Reviewed With: patient      Patient having adequate pain control with tylenol and ibuprofen PO.  Complains of some mild to moderate intermittent uterine cramping.  Fundus firm below umbilicus, lochia within normal limits, no clots.  Independent with self and infant cares.  Will continue to monitor.

## 2024-10-03 NOTE — LACTATION NOTE
"Initial visit with DAMIAN Hardy and baby Promise.    Shown breast feeding section in our \"Guide to Postpartum and  Care.\"   Emphasized importance of skin to skin for enhancing early breastfeeding success.  Instructed how to preform hand expression, encouraging mother to view hand expression video (provided).    Discussed  breastfeeding basics:   1) Watch for early feeding cues (licking lips, stirring or rooting, sucking movement with mouth, hands to mouth)  2) Feed infant on demand, a minimum of 8 times in 24 hours (recommended waking infant if it's been 3 hours since last feeding)  3) Techniques to waking a sleepy baby to nurse: (undress infant, change diaper if necessary, gently stroking bottom of feet and back, snuggling infant skin to skin, expressing colostrum).       Parents educated to \"typical\"  feeding patterns/behavior: Day 1 sleepiness (birthday nap) through cluster-feeding on day (night) 2. Educated on nutritive vs non-nutritive suckling patterns. Showed how to record infant feedings along with voids and stools in the provided feeding log.     Outpatient resources reviewed.  Has a breast pump for home. Continues to nurse well per mom. No further questions at this time.   Will follow as needed.   Emani Hernandez BSN, RN, PHN, RNC-MNN, IBCLC    "

## 2024-10-03 NOTE — PLAN OF CARE
Data: Luba Hill transferred to [facility] via wheelchair at 0220. Baby transferred via parent's arms.  Action: Receiving unit notified of transfer: Yes. Patient and family notified of room change. Report given to Atiqa at bedside. Belongings sent to receiving unit. Accompanied by Registered Nurse. Oriented patient to surroundings. Call light within reach. ID bands double-checked with receiving RN.  Response: Patient tolerated transfer and is stable.Goal Outcome Evaluation:

## 2024-10-03 NOTE — PROVIDER NOTIFICATION
10/02/24 1958   Provider Notification   Provider Name/Title Dr. Tavarez   Method of Notification Phone   Request Evaluate - Remote   Notification Reason SVE     MD called and requested SVE be performed.

## 2024-10-03 NOTE — PLAN OF CARE
Baby breastfeeding on demand every 2-3 hours. Pain controlled with Ibuprofen and Tylenol. Up independently, voiding without difficulty. Postpartum assessment WNL, see flowsheets for more information. Spouse at bedside, supportive. Caregiver education and support on-going.    Dillon Pickard RN    Goal Outcome Evaluation:      Plan of Care Reviewed With: patient    Overall Patient Progress: improvingOverall Patient Progress: improving

## 2024-10-04 VITALS
BODY MASS INDEX: 36.52 KG/M2 | OXYGEN SATURATION: 98 % | DIASTOLIC BLOOD PRESSURE: 68 MMHG | TEMPERATURE: 98.1 F | RESPIRATION RATE: 16 BRPM | HEIGHT: 67 IN | SYSTOLIC BLOOD PRESSURE: 116 MMHG | WEIGHT: 232.7 LBS | HEART RATE: 65 BPM

## 2024-10-04 PROCEDURE — 250N000013 HC RX MED GY IP 250 OP 250 PS 637: Performed by: SPECIALIST

## 2024-10-04 RX ORDER — IBUPROFEN 600 MG/1
600 TABLET, FILM COATED ORAL EVERY 6 HOURS PRN
Status: SHIPPED
Start: 2024-10-04 | End: 2024-10-16

## 2024-10-04 RX ORDER — DOCUSATE SODIUM 100 MG/1
100 CAPSULE, LIQUID FILLED ORAL 2 TIMES DAILY PRN
Status: SHIPPED
Start: 2024-10-04

## 2024-10-04 RX ORDER — ACETAMINOPHEN 325 MG/1
650 TABLET ORAL EVERY 6 HOURS PRN
Status: SHIPPED
Start: 2024-10-04 | End: 2024-10-16

## 2024-10-04 RX ADMIN — IBUPROFEN 800 MG: 400 TABLET, FILM COATED ORAL at 13:03

## 2024-10-04 RX ADMIN — IBUPROFEN 800 MG: 400 TABLET, FILM COATED ORAL at 00:54

## 2024-10-04 RX ADMIN — DOCUSATE SODIUM 100 MG: 100 CAPSULE, LIQUID FILLED ORAL at 07:10

## 2024-10-04 RX ADMIN — IBUPROFEN 800 MG: 400 TABLET, FILM COATED ORAL at 07:10

## 2024-10-04 RX ADMIN — ACETAMINOPHEN 650 MG: 325 TABLET ORAL at 07:10

## 2024-10-04 RX ADMIN — ACETAMINOPHEN 650 MG: 325 TABLET ORAL at 00:53

## 2024-10-04 RX ADMIN — ACETAMINOPHEN 650 MG: 325 TABLET ORAL at 13:03

## 2024-10-04 ASSESSMENT — ACTIVITIES OF DAILY LIVING (ADL)
ADLS_ACUITY_SCORE: 18

## 2024-10-04 NOTE — PLAN OF CARE
Goal Outcome Evaluation:            Vital signs stable. Postpartum assessment WDL. Pain controlled with tylenol and motrin . Patient up ambulating  voiding without difficulty. Working on Breastfeeding  Patient and infant bonding well.planning to discharge today  Will continue with current plan of care.

## 2024-10-04 NOTE — LACTATION NOTE
"Follow up Lactation visit with Antony, significant other Wil & baby girl Promise. Getting ready for discharge. Antony reports feeding is going well, breastfeeding frequently. She was latched at time of visit on left side with a deep latch noted, strong, nutritive suck pattern, and audible swallows noted. Antony does have some bruising on her right side that she feels is likely due to a poor latch at one point, but she feels Promise latches well in general. She shared Promise was sleepy this morning at a feeding, so we discussed what to do to help her wake up and feed. Discussed if she has one feeding in 24 hours that doesn't happen \"exactly\" at 3 hours from the last feeding, as long as she's eating at least 8 times in 24 hours and meeting diaper minimums, that is not cause for alarm. Recommend feeding on demand or at least every 3 hours whenever possible until breastfeeding is well established. We reviewed general position guidance for feedings and how to add in a deep U sandwich hold for breast to help baby with feeding and latching on as needed. Reviewed cross cradle and football hold.    Discussed cluster feeding, what it is and when to expect it, The Second Night, satiety cues, feeding cues, and reviewed Feeding Log for home use. Encouraged to review Breastfeeding section in Your Guide to Postpartum & [place] [facility].    Reviewed milk supply and engorgement. Reviewed typical timeline of milk supply initiation and progression over first 3-5 days postpartum. Discussed comfort measures for engorgement, plugged duct treatment, and warning signs of breast infection. General questions answered regarding pumping, when it's helpful and necessary. Reviewed general recommendation to wait to start pumping until breastfeeding is well established unless there are feeding difficulties or engorgement not relieved by feeding baby or hand expression. Discussed introducing a bottle and recommendation to wait for bottle introduction for 3-4 " weeks unless baby needs to supplement for medical reasons.    Feeding plan: Recommend unlimited, frequent breast feedings: At least 8 - 12 times every 24 hours. Avoid pacifiers and supplementation with formula unless medically indicated. Encouraged use of feeding log and to record feedings, and void/stool patterns. Antony has a breast pump for home use. Follow up with [facility]. Reviewed outpatient lactation resources. Antony & Wil very appreciative of visit.    Lizz Haney, RN, BSN, MNN, IBCLC

## 2024-10-04 NOTE — PLAN OF CARE
Goal Outcome Evaluation:      Plan of Care Reviewed With: patient, spouse    Overall Patient Progress: improvingOverall Patient Progress: improving       Vital signs stable. Patient voiding without difficulty. Able to ambulate in room free of dizziness. Taking Tylenol and Ibuprofen for pain management. Working on breastfeeding infant every 2-3 hours. Patient denies symptoms of Pre-Eclampsia. Fundus is firm with scant flow. Patient encouraged to call with questions or concerns.

## 2024-10-04 NOTE — PLAN OF CARE
D: VSS, assessments WDL.   I: Pt. received complete discharge paperwork and home medications .  Pt. was given times of last dose for all discharge medications in writing on discharge medication sheets.  Discharge teaching included home medication, pain management, activity restrictions, postpartum cares, and signs and symptoms of infection.    A: Discharge outcomes on care plan met.  Mother states understanding and comfort with self care and follow up care.   P: Pt. Discharged.  Pt. was accompanied by Spouse  and left with personal belongings. .  Pt. to follow up with OB provider per discharge instructions.  Pt. had no further questions at the time of discharge and no unmet needs were identified.

## 2024-10-04 NOTE — PROGRESS NOTES
Postpartum progress note  PPD#2 s/p     S: Feeling well.  Minimal pain.  Ambulating and voiding without difficulty.  Lochia is appropriate and decreasing.  Working on breastfeeding, has been going well.  Plan for discharge today.  Instructions reviewed.    O:  Vitals:    10/03/24 2050 10/04/24 0204 10/04/24 0753 10/04/24 0833   BP: 122/74 109/66 116/68    BP Location: Left arm Left arm     Patient Position: Semi-Sanchez's Semi-Sanchez's     Cuff Size: Adult Regular Adult Regular     Pulse: 89 87 65    Resp: 16 16 16    Temp: 97.7  F (36.5  C) 98.4  F (36.9  C) 98.1  F (36.7  C)    TempSrc: Oral Oral Oral    SpO2:       Weight:    105.6 kg (232 lb 11.2 oz)   Height:         Gen: NAD  Resp: No increased work of breathing  Abd: soft, nontender  Ext: warm, well-perfused    Hemoglobin   Date Value Ref Range Status   10/02/2024 11.5 (L) 11.7 - 15.7 g/dL Final     A/P: 35 y/o G1 PPD#2 s/p , doing well postpartum  PPD#2  -Routine care    2. Female infant  -Working on breastfeeding    3. Rh positive/Rubella immune/S/p tdap    4. BCM  -To be discussed at postpartum visit    5. Dispo  -Discharge home today    Electronically signed by:  Maryan Corbin  [facility]  [facility] Office  Pager: [phone]  2024

## 2024-10-04 NOTE — DISCHARGE INSTRUCTIONS
Warning Signs after Having a Baby    Keep this paper on your fridge or somewhere else where you can see it.    Call your provider if you have any of these symptoms up to 12 weeks after having your baby.    Thoughts of hurting yourself or your baby  Pain in your chest or trouble breathing  Severe headache not helped by pain medicine  Eyesight concerns (blurry vision, seeing spots or flashes of light, other changes to eyesight)  Fainting, shaking or other signs of a seizure    Call 9-1-1 if you feel that it is an emergency.     The symptoms below can happen to anyone after giving birth. They can be very serious. Call your provider if you have any of these warning signs.    My provider s phone number: _______________________    Losing too much blood (hemorrhage)    Call your provider if you soak through a pad in less than an hour or pass blood clots bigger than a golf ball. These may be signs that you are bleeding too much.    Blood clots in the legs or lungs    After you give birth, your body naturally clots its blood to help prevent blood loss. Sometimes this increased clotting can happen in other areas of the body, like the legs or lungs. This can block your blood flow and be very dangerous.     Call your provider if you:  Have a red, swollen spot on the back of your leg that is warm or painful when you touch it.   Are coughing up blood.     Infection    Call your provider if you have any of these symptoms:  Fever of 100.4 F (38 C) or higher.  Pain or redness around your stitches if you had an incision.   Any yellow, white, or green fluid coming from places where you had stitches or surgery.    Mood Problems (postpartum depression)    Many people feel sad or have mood changes after having a baby. But for some people, these mood swings are worse.     Call your provider right away if you feel so anxious or nervous that you can't care for yourself or your baby.    Preeclampsia (high blood pressure)    Even if you  didn't have high blood pressure when you were pregnant, you are at risk for the high blood pressure disease called preeclampsia. This risk can last up to 12 weeks after giving birth.     Call your provider if you have:   Pain on your right side under your rib cage  Sudden swelling in the hands and face    Remember: You know your body. If something doesn't feel right, get medical help.     For informational purposes only. Not to replace the advice of your health care provider. Copyright 2020 [facility]. All rights reserved. Clinically reviewed by Brandy Manrique, RNC-OB, MSN. [facility] [id number] - Rev 02/23.

## 2024-10-04 NOTE — ANESTHESIA POSTPROCEDURE EVALUATION
Patient: Luba Hill    Procedure: * No procedures listed *       Anesthesia Type:  Epidural    Note:  Disposition: Inpatient   Postop Pain Control: Uneventful            Sign Out: Well controlled pain   PONV: No   Neuro/Psych: Uneventful            Sign Out: Acceptable/Baseline neuro status   Airway/Respiratory: Uneventful            Sign Out: Acceptable/Baseline resp. status   CV/Hemodynamics: Uneventful            Sign Out: Acceptable CV status; No obvious hypovolemia; No obvious fluid overload   Other NRE: NONE   DID A NON-ROUTINE EVENT OCCUR? No    Event details/Postop Comments:  Patient doing well. No headaches, low back pain or residual numbness/paresthesias. Eating and drinking without difficulty. All questions answered. No concerns from patient. No anesthetic complications.              Last vitals:  Vitals:    10/03/24 0829 10/03/24 1325 10/03/24 1640   BP: 105/59 123/80 129/85   Pulse: 76 89 93   Resp: 16 16 16   Temp: 36.8  C (98.2  F) 36.8  C (98.2  F) 36.7  C (98.1  F)   SpO2:          Electronically Signed By: María Patel MD  October 3, 2024  9:23 PM  
08-Mar-2020 17:00

## 2024-10-04 NOTE — DISCHARGE SUMMARY
[facility] Discharge Summary    Luba Hill MRN# [id number]   Age: 34 year old YOB: 1990     Date of Admission:  10/2/2024  Date of Discharge::  10/4/2024  3:45 PM  Admitting Physician:  Juliana Tavarez MD  Discharge Physician:  Maryan Corbin MD     Home clinic: [facility]          Admission Diagnoses:   IUP @ 40+3  Labor  GBS negative          Discharge Diagnosis:     Normal spontaneous vaginal delivery          Procedures:     Procedure(s):        No other procedures performed during this admission           Medications Prior to Admission:     No medications prior to admission.             Discharge Medications:     Discharge Medication List as of 10/4/2024  1:44 PM        START taking these medications    Details   acetaminophen (TYLENOL) 325 MG tablet Take 2 tablets (650 mg) by mouth every 6 hours as needed for mild pain or fever (greater than or equal to 38 C /100.4 F (oral) or 38.5 C/ 101.4 F (core).)., No Print Out      docusate sodium (COLACE) 100 MG capsule Take 1 capsule (100 mg) by mouth 2 times daily as needed for constipation., No Print Out      ibuprofen (ADVIL/MOTRIN) 600 MG tablet Take 1 tablet (600 mg) by mouth every 6 hours as needed for other (cramping)., No Print Out           CONTINUE these medications which have NOT CHANGED    Details   Prenatal Vit-Fe Fumarate-FA (PRENATAL MULTIVITAMIN  PLUS IRON) 27-1 MG TABS Take by mouth daily, Historical           STOP taking these medications       aspirin 81 MG EC tablet Comments:   Reason for Stopping:                     Consultations:   Lactation          Brief History of Labor:   Antony presented in early labor to triage and was found to be 1.5cm.  2 hours later in the office she was 3-4cm dilated.  She then represented in the afternoon date of admission.  Incidental AROM with exam at 4cm occurred and she progressed into labor.  She received an epidural.  She progressed quickly to complete.   Despite long prodromal early labor active labor was fast.  Fetus was OP however patient only pushed 30 min with minimal laceration.  Please see delivery note for details.           Hospital Course:   The patient's hospital course was unremarkable.  On discharge, her pain was well controlled. Vaginal bleeding is similar to peak menstrual flow.  Voiding without difficulty.  Ambulating well and tolerating a normal diet.  No fever.  Breastfeeding well.  Infant is stable.  No bowel movement yet.  She was discharged on post-partum day #2.    Post-partum hemoglobin:   Hemoglobin   Date Value Ref Range Status   10/02/2024 11.5 (L) 11.7 - 15.7 g/dL Final             Discharge Instructions and Follow-Up:     Discharge diet: Regular   Discharge activity: Pelvic rest: abstain from intercourse and do not use tampons for 6 week(s)   Discharge follow-up: Follow up with Dr. Corbin in 2 and 6 weeks   Wound care: Sitz baths PRN           Discharge Disposition:     Discharged to home      Attestation:  Total time: 25 minutes    Maryan Corbin MD   Electronically signed by:  Maryan Corbin  [facility] Office  Pager: [phone]  October 4, 2024

## 2024-10-07 NOTE — PROGRESS NOTES
Assessment:    Five day old infant gaining weight well, primarily bottlefeeding expressed and donor milk   Baby with good suck and able to latch to breast after assistance with positioning  Slightly low milk transfer today:  in need of continued supplementation until breastfeeding more effectively  Mother with milk supply becoming established    Plan:    Use good positioning for deep latch, with baby held close to body and baby's head/shoulders/hips in good alignment.  When in a seated position, use a pillow to help bring baby close to breasts, and stepstool to elevate your knees above hips.    When bringing your baby to your breast, compress your breast vertically and in line with baby's mouth--this will help them to get a larger mouthful of breast and a deeper latch. Babies latch best to the breast by bringing their chin in first, so point your nipple towards baby's nose, tuck the chin in close, and then wait for her mouth to open.  When her mouth opens, bring her head in deeply.  Baby's chin should be snugged deeply in your breast, their upper cheeks should be touching the breast, and their nose just out of the breast.   If there is pinching or pain, or if her mouth is not widely open, try using a finger to give a little gentle pressure on her chin to help her open more widely and take in more of your breast.  If it is still painful, use a finger to break the suction, remove baby from the breast and try again until there is no pain with nursing.  There is sometimes a little pain when the baby first begins sucking, but after the first few seconds there should be no pain--only a tugging feeling.   If your baby is struggling with latch and is unable to grasp your breast after several tries, consider adding the nipple shield.  Try to use this before baby gets distressed and frantic, because the feeding will go more easily--feeding should not be difficult and frustrating.  Position it with the cut-out where baby's  "nose will land, and turn it inside out a bit while applying so that your nipple is drawn deeply into the shield.  If your baby is still seeming frustrated, you can fill the shield with some milk using hand expression, to provide her with an \"instant reward\" and encourage continued suckling.  Just use the shield if it is needed;  It may be needed some times and not others, or on one breast and not the other.  Once he is doing well, you can work towards weaning from it completely.      Continue to feed Promise on cue, 8-12 times each day, offering the breast as often as you are able.  Once your baby has returned to their birthweight, you can trust them to awaken when they need to eat--you do not need to awaken them on a schedule.  Offer your breast when Promise is calm and alert, willing to eat but ideally not extremely hungry.  When you nurse, feed on one side until baby finishes swallowing.  Once swallowing slows, use breast compression to encourage more swallowing, but once there is no more active swallowing, and baby is either sleeping, coming off the breast, or just \"nibbling,\" it is OK to use a finger to take baby off the breast and move to the other breast.  Do the same on the other side.  Offer both breasts at each feeding.  It is more important to watch the baby than the clock!    Promise will still need some feedings by bottle while she is becoming more effective at breastfeeding.  If she nurses as she did in the office today, with some periods of active swallowing also needing some stimulation to continue feeding, give her about an ounce of milk in a bottle after nursing.  If you do not offer the breast at a feeding, then give 1.5 - 2 oz. When bottlefeeding, always use the paced feeding method.  Continue pumping as you have been to have this extra milk to offer to Promise and promote strong milk supply.  Sometimes pumping while you have some warmth on your breasts (like a heating pad or microwaved hot pack) is " "helpful, and gentle massage can also help release more milk.   It is more effective to pump more frequently for shorter time periods than it is to pump less often for longer:  For example, it is better to pump 6 times/day for 15 minutes each than it is to pump 3 times/day for 30 minutes.     Pumping should be comfortable, releasing milk without pain. When pumping, your nipple should be drawn into the flange tunnel, and your areola into the funnel-shaped area.  The nipple should be able to move freely in the tunnel and should not rub on the sides of the tunnel;  The areola should not be inside the tunnel.  The best time to check flange fit is after you have been pumping for a few minutes, because the nipple grows a bit while pumping.   Babies slowly increase the amount of milk that they need, but only to a point:  Once babies are about a month old, they need about 25-30 oz/day (or 3- 4 oz each feeding if they eat about 8 times/day) and this where their needs stay for their entire first year;  you don't need to keep producing more and more milk as they grow.    A \"triple feeding\" plan like this--nurse, supplement, pump--is very difficult, exhausting, and not sustainable over the long term. The goal is to do it for a short time in order to help Promise gain weight well, be more energetic and effective at the breast, and increase your milk supply so that you can give her more of your milk and less donor milk.   It is important to re-evaluate the plan in 1-2 weeks to see if it has been effective and make sure you have a comfortable feeding pattern going forward.   Follow up with lactation in one week, and pediatric provider as planned.  [employer] can be used for brief questions, but it's important to know that messages are not seen Friday through Sunday. If urgent help is needed, Monday through Friday you can call [phone] and one of our lactation consultants will get the message and respond; if you need a rapid " "response over a weekend or holiday, it is best to call your on-call maternity or pediatric provider.  Please feel free to schedule a return visit if the concern is more detailed;  telephone visits are also an option if you don't feel you need to be seen in person.       Subjective: Antony and Wil here today because of concerns with baby Promise's latch.  They report that baby nursed well during first day or two, but shortly before discharge \"latch disintegrated\" and baby would not latch well to breast, becoming very restless and fussy.  They continued to offer the breast every 2 hours after discharge to home but with minimal success and significant frustration;  noticed some possible reduction in her wet diapers, so when baby was 3-4 days old ceased attempts to offer the breast.  They have been bottlefeeding with expressed and donor milk, with about 60 ml each feeding. Antony has been pumping about 8 times/day, yielding 20 - 30 ml each pumping session, and about 2 oz at a \"power pump\" session this morning.   Using Spectra pump with 21mm flanges.  Antony would like to try to bring Promise back to the breast.    Antony is vaccinated for Covid-19, with most recent dose 9/11/24.    Hospital Course:  Augmentation for PROM in early labor;  uncomplicated birth.  Baby breastfeeding well in hospital;  seen by IBCLC for routine support.    Mother's Relevant Med/Surg History: PCOS previously on Metformin;  pyloric stenosis    Breastfeeding Goals: to re-establish some direct breastfeeding    Previous Breastfeeding Experience: first baby    Infant's name: Promise  Infant's bday: [birth year]  Gestational age: 40w3d  Infant's birth weight: 7 # 12.9 oz   Discharge weight: 7 # 6.2 oz     Pediatric Provider: [facility], [facility]. Antony gives her permission for today's note to be forwarded to [facility].  BLANCA signed and filed in Antony's chart as Promise has no local active pediatric chart.   Recent weights:  10/7/24:  7 # 5 oz    [facility] Lactation " "Visit Questionnaire    10/9/2024  1:00 PM CDT - Filed by Patient   What is your main concern today? lactation consult   Your baby's first name: valdemar   Your baby's last name: fidel   Type of Birth Vaginal   Your doctor/midwife: dr dedrick alegria   Baby's doctor or nurse practitioner: [facility]   Baby's birthday: 10/2/2024   Birth weight: 7lb 13oz   Baby's weight just before leaving the hospital: 7lb 5oz   Baby's most recent weight: 7lb 5oz   Date: 10/7   How often does your baby eat? every 2-3 hrs   How long does each feeding last?  45min   How much of the time does your baby take both breasts when nursing? 0   Can you hear the baby swallowing during nursing? No   How many times does your baby feed in 24 hours? 12   How many times does your baby urinate (pee) in 24 hours? 5   How many stools (poops) does your baby have in 24 hours? 5   Describe the color and consistency of the poop: yellow brown soft chunky not sticky   Do you give your baby extra milk in addition to or instead of breastfeeding? Breastmilk   How much extra do you usually give? 2oz every 2-3hr   How do you give extra milk? Bottle   Are you pumping your breasts? Yes   How often? every 2-3 hr or any time baby eats   How much is pumped? 0.75 to 2oz in 20 to 60 minutes   When you were pregnant did your breasts grow larger? Yes   Did your areola (the dark area around your nipple) grow larger or darker? Yes   Did you notice your breasts fill when your baby was 3-5 days old? Yes   Have you had any breast surgeries? No   Please select any of the following medical conditions you have been previously diagnosed with or are currently being treated for: Polycystic Ovarian Syndrome   What else would you like the lactation consultant to know? none         Objective/Physical exam:   Her nipples are everted, the areola is compressible, the breast is soft and full.     Sore nipples: no   EPDS: 6, with \"never\" marked for question on thoughts of self-harm "     Assessment of infant: 46,36% Weight for age percentile   Age today: 5 days  Today's weight: 7 # 8.2 oz  Amount of milk transferred: 0.2 oz    Baby has full flexion of arms and legs, normal tone, behavior is alert and active, respirations are normal, skin is normal, hydration is normal, jaw is normal size and alignment, palate is normal, frenulum is normal, baby can lateralize tongue, has adequate tongue lift, and tongue can protrude past bottom gum line. Upper labial frenulum is normal.    Suck exam:  Baby has strong, coordinated suck with good tongue cupping    Baby thrush: none    Jaundice: none      Feeding assessment: After assistance with positioning, baby can hold suction with tongue while at the breast.  She did require frequent stimulation to continue productive suckling    Alignment: The baby was flex relaxed. Baby's head was aligned with its trunk. Baby did face mother. Baby was in cross cradle position today.   Areolar Grasp: Baby was able to open mouth widely. Baby's lips were not pursed. Baby's lips did flange outward. Tongue was visible over bottom gum. Baby had complete seal.     Areolar Compression: Baby made periods of rhythmic motion. There were no clicking or smacking sounds. There was no severe nipple discomfort. Nipples appeared rounded after feeding.  Audible swallowing: Baby made some quiet sounds of swallowing: There was a small increase in frequency after milk ejection reflex. The milk ejection reflex is normal and milk supply is becoming established.    /87   Pulse 102   Wt 102.1 kg (225 lb)   SpO2 99%   BMI 35.24 kg/m    OB History    Para Term  AB Living   1 1 1 0 0 1   SAB IAB Ectopic Multiple Live Births   0 0 0 0 1      # Outcome Date GA Lbr Murray/2nd Weight Sex Type Anes PTL Lv   1 Term 10/02/24 40w3d / 00:36 3.54 kg (7 lb 12.9 oz) F Vag-Spont EPI N CODIE      Name: Promise JONES Jabiermargaux      Apgar1: 8  Apgar5: 9       Current Outpatient Medications:      acetaminophen (TYLENOL) 325 MG tablet, Take 2 tablets (650 mg) by mouth every 6 hours as needed for mild pain or fever (greater than or equal to 38 C /100.4 F (oral) or 38.5 C/ 101.4 F (core).)., Disp: , Rfl:     docusate sodium (COLACE) 100 MG capsule, Take 1 capsule (100 mg) by mouth 2 times daily as needed for constipation., Disp: , Rfl:     ibuprofen (ADVIL/MOTRIN) 600 MG tablet, Take 1 tablet (600 mg) by mouth every 6 hours as needed for other (cramping)., Disp: , Rfl:     Prenatal Vit-Fe Fumarate-FA (PRENATAL MULTIVITAMIN  PLUS IRON) 27-1 MG TABS, Take by mouth daily, Disp: , Rfl:   Past Medical History:   Diagnosis Date    PCOS (polycystic ovarian syndrome)      Past Surgical History:   Procedure Laterality Date    US PYLORUS PEDIATRIC       History reviewed. No pertinent family history.    Time spent:  Chart review/prechartin min prior to day of service  Face-to-face visit:   62 min   Documentation:  20 min   Total time spent on day of service: 82 min    KRISH Lazaro, CNM, IBCLC

## 2024-10-09 ENCOUNTER — OFFICE VISIT (OUTPATIENT)
Dept: OBGYN | Facility: CLINIC | Age: 34
End: 2024-10-09
Payer: COMMERCIAL

## 2024-10-09 VITALS
OXYGEN SATURATION: 99 % | WEIGHT: 225 LBS | DIASTOLIC BLOOD PRESSURE: 87 MMHG | BODY MASS INDEX: 35.24 KG/M2 | SYSTOLIC BLOOD PRESSURE: 125 MMHG | HEART RATE: 102 BPM

## 2024-10-09 DIAGNOSIS — O92.79 OTHER DISORDERS OF LACTATION: Primary | ICD-10-CM

## 2024-10-09 PROCEDURE — 99205 OFFICE O/P NEW HI 60 MIN: CPT | Performed by: ADVANCED PRACTICE MIDWIFE

## 2024-10-09 PROCEDURE — 99417 PROLNG OP E/M EACH 15 MIN: CPT | Performed by: ADVANCED PRACTICE MIDWIFE

## 2024-10-09 ASSESSMENT — EDINBURGH POSTNATAL DEPRESSION SCALE (EPDS)
I HAVE BEEN SO UNHAPPY THAT I HAVE BEEN CRYING: ONLY OCCASIONALLY
I HAVE BEEN SO UNHAPPY THAT I HAVE HAD DIFFICULTY SLEEPING: NOT AT ALL
THINGS HAVE BEEN GETTING ON TOP OF ME: NO, MOST OF THE TIME I HAVE COPED QUITE WELL
I HAVE FELT SAD OR MISERABLE: NOT VERY OFTEN
I HAVE BEEN ANXIOUS OR WORRIED FOR NO GOOD REASON: NO, NOT AT ALL
I HAVE BLAMED MYSELF UNNECESSARILY WHEN THINGS WENT WRONG: YES, SOME OF THE TIME
I HAVE FELT SCARED OR PANICKY FOR NO GOOD REASON: NO, NOT MUCH
TOTAL SCORE: 6
I HAVE LOOKED FORWARD WITH ENJOYMENT TO THINGS: AS MUCH AS I EVER DID
I HAVE BEEN ABLE TO LAUGH AND SEE THE FUNNY SIDE OF THINGS: AS MUCH AS I ALWAYS COULD
THE THOUGHT OF HARMING MYSELF HAS OCCURRED TO ME: NEVER

## 2024-10-09 NOTE — PATIENT INSTRUCTIONS
"   Use good positioning for deep latch, with baby held close to body and baby's head/shoulders/hips in good alignment.  When in a seated position, use a pillow to help bring baby close to breasts, and stepstool to elevate your knees above hips.    When bringing your baby to your breast, compress your breast vertically and in line with baby's mouth--this will help them to get a larger mouthful of breast and a deeper latch. Babies latch best to the breast by bringing their chin in first, so point your nipple towards baby's nose, tuck the chin in close, and then wait for her mouth to open.  When her mouth opens, bring her head in deeply.  Baby's chin should be snugged deeply in your breast, their upper cheeks should be touching the breast, and their nose just out of the breast.   If there is pinching or pain, or if her mouth is not widely open, try using a finger to give a little gentle pressure on her chin to help her open more widely and take in more of your breast.  If it is still painful, use a finger to break the suction, remove baby from the breast and try again until there is no pain with nursing.  There is sometimes a little pain when the baby first begins sucking, but after the first few seconds there should be no pain--only a tugging feeling.   If your baby is struggling with latch and is unable to grasp your breast after several tries, consider adding the nipple shield.  Try to use this before baby gets distressed and frantic, because the feeding will go more easily--feeding should not be difficult and frustrating.  Position it with the cut-out where baby's nose will land, and turn it inside out a bit while applying so that your nipple is drawn deeply into the shield.  If your baby is still seeming frustrated, you can fill the shield with some milk using hand expression, to provide her with an \"instant reward\" and encourage continued suckling.  Just use the shield if it is needed;  It may be needed some " "times and not others, or on one breast and not the other.  Once he is doing well, you can work towards weaning from it completely.      Continue to feed Promise on cue, 8-12 times each day, offering the breast as often as you are able.  Once your baby has returned to their birthweight, you can trust them to awaken when they need to eat--you do not need to awaken them on a schedule.  Offer your breast when Promise is calm and alert, willing to eat but ideally not extremely hungry.  When you nurse, feed on one side until baby finishes swallowing.  Once swallowing slows, use breast compression to encourage more swallowing, but once there is no more active swallowing, and baby is either sleeping, coming off the breast, or just \"nibbling,\" it is OK to use a finger to take baby off the breast and move to the other breast.  Do the same on the other side.  Offer both breasts at each feeding.  It is more important to watch the baby than the clock!    Promise will still need some feedings by bottle while she is becoming more effective at breastfeeding.  If she nurses as she did in the office today, with some periods of active swallowing also needing some stimulation to continue feeding, give her about an ounce of milk in a bottle after nursing.  If you do not offer the breast at a feeding, then give 1.5 - 2 oz. When bottlefeeding, always use the paced feeding method.  Continue pumping as you have been to have this extra milk to offer to Promise and promote strong milk supply.  Sometimes pumping while you have some warmth on your breasts (like a heating pad or microwaved hot pack) is helpful, and gentle massage can also help release more milk.   It is more effective to pump more frequently for shorter time periods than it is to pump less often for longer:  For example, it is better to pump 6 times/day for 15 minutes each than it is to pump 3 times/day for 30 minutes.     Pumping should be comfortable, releasing milk without pain. " "When pumping, your nipple should be drawn into the flange tunnel, and your areola into the funnel-shaped area.  The nipple should be able to move freely in the tunnel and should not rub on the sides of the tunnel;  The areola should not be inside the tunnel.  The best time to check flange fit is after you have been pumping for a few minutes, because the nipple grows a bit while pumping.   Babies slowly increase the amount of milk that they need, but only to a point:  Once babies are about a month old, they need about 25-30 oz/day (or 3- 4 oz each feeding if they eat about 8 times/day) and this where their needs stay for their entire first year;  you don't need to keep producing more and more milk as they grow.    A \"triple feeding\" plan like this--nurse, supplement, pump--is very difficult, exhausting, and not sustainable over the long term. The goal is to do it for a short time in order to help Promise gain weight well, be more energetic and effective at the breast, and increase your milk supply so that you can give her more of your milk and less donor milk.   It is important to re-evaluate the plan in 1-2 weeks to see if it has been effective and make sure you have a comfortable feeding pattern going forward.   Follow up with lactation in one week, and pediatric provider as planned.  [employer] can be used for brief questions, but it's important to know that messages are not seen Friday through Sunday. If urgent help is needed, Monday through Friday you can call [phone] and one of our lactation consultants will get the message and respond; if you need a rapid response over a weekend or holiday, it is best to call your on-call maternity or pediatric provider.  Please feel free to schedule a return visit if the concern is more detailed;  telephone visits are also an option if you don't feel you need to be seen in person.     _________________      Coaxing your baby back to the breast:  Use supportive positioning, " "and offer the breast when baby is calm.    Offer the breast for about 10 minutes or so. If baby is getting upset or crying, stop offering. Keep breastfeeding nurturing and positive. If either you or the baby is getting frustrated, consider taking a break and trying another time.  Bait and switch: if baby is very hungry they may not be interested in latching. Sometimes offering a small supplement before latching can take the edge off and then offering the breast.  Offer frequent \"whim\" breastfeeding, in a casual way.   Hold baby skin-to-skin often. Sometimes holding skin to skin can allow you to catch early feeding cues.   Consider co-bathing by relaxing in a warm bathtub while holding your baby skin to skin, they may be interested in latching.  (Do this with another adult present in the house--babies are slippery!)  Consider offering the breast as baby is just waking or in a drowsy state.     ________________    This is a great video that shows a baby nursing well at the breast, and how to tell when baby is actively swallowing:      Is Your Baby Getting Enough Milk?   https://globalhealthmedia.org/videos/is-your-baby-getting-enough-milk/       _________________      This is an excellent series of articles on how to best offer a bottle to  (or any!) babies.      Bottlefeeding:  types of bottles, method for best bottlefeeding and bottle refusal   https://www.[email or web address]/blog/bottles-for--babies-introducing-a-bottle-part-1/    Video demonstration of paced bottlefeeding  https://www.[employer].org/paced-feeding    ________________     If you find that you are having a lot of uncomfortable leaking, you can just use a hand to put some pressure on your areolar area and this will stop the flow.  If you would like to use a  to gather enough milk for a bottle, you could use a type that stays in place with suction and draws out the milk.  The best way to use these is to nurse your baby " "on one side, and then when you move baby to the second side, place the  on the first side.  In this way baby always gets \"first choice.\"  Just collect the amount of milk that you will use. Alternatively, if you would like to collect the milk that is released with letdown without encouraging more supply, consider a type of  that does not use suction to stay in place.  The [employer] Renuka, [employer] Catch, and [employer] Milk-Saver can all be used in this way.       _________________    Good breastfeeding resources:    Websites:  www.[employer]  www.[employer]/blog/  www.llli.org/breastfeeding-info/    Instagram:    @"[employer]yonatan  @[facility] Rita's Medical Centerbetterboob    Books:   The Womanly Art of Breastfeeding by [employer]  Breastfeeding Doesn't Need to Suck, by Gema Page      For help with using baby carriers:  https://babywearingtwincities.org/   "

## 2024-10-10 ENCOUNTER — MEDICAL CORRESPONDENCE (OUTPATIENT)
Dept: HEALTH INFORMATION MANAGEMENT | Facility: CLINIC | Age: 34
End: 2024-10-10
Payer: COMMERCIAL

## 2024-10-16 ENCOUNTER — OFFICE VISIT (OUTPATIENT)
Dept: OBGYN | Facility: CLINIC | Age: 34
End: 2024-10-16
Payer: COMMERCIAL

## 2024-10-16 DIAGNOSIS — O92.79 OTHER DISORDERS OF LACTATION: Primary | ICD-10-CM

## 2024-10-16 PROBLEM — Z36.89 ENCOUNTER FOR TRIAGE IN PREGNANT PATIENT: Status: RESOLVED | Noted: 2024-10-02 | Resolved: 2024-10-16

## 2024-10-16 PROCEDURE — 99417 PROLNG OP E/M EACH 15 MIN: CPT | Performed by: ADVANCED PRACTICE MIDWIFE

## 2024-10-16 PROCEDURE — 99215 OFFICE O/P EST HI 40 MIN: CPT | Performed by: ADVANCED PRACTICE MIDWIFE

## 2024-10-16 ASSESSMENT — EDINBURGH POSTNATAL DEPRESSION SCALE (EPDS)
I HAVE FELT SAD OR MISERABLE: NOT VERY OFTEN
TOTAL SCORE: 10
I HAVE LOOKED FORWARD WITH ENJOYMENT TO THINGS: AS MUCH AS I EVER DID
I HAVE BLAMED MYSELF UNNECESSARILY WHEN THINGS WENT WRONG: YES, SOME OF THE TIME
I HAVE BEEN ANXIOUS OR WORRIED FOR NO GOOD REASON: YES, SOMETIMES
I HAVE BEEN ABLE TO LAUGH AND SEE THE FUNNY SIDE OF THINGS: NOT QUITE SO MUCH NOW
I HAVE FELT SCARED OR PANICKY FOR NO GOOD REASON: NO, NOT MUCH
I HAVE BEEN SO UNHAPPY THAT I HAVE HAD DIFFICULTY SLEEPING: NOT AT ALL
THINGS HAVE BEEN GETTING ON TOP OF ME: YES, SOMETIMES I HAVEN'T BEEN COPING AS WELL AS USUAL
I HAVE BEEN SO UNHAPPY THAT I HAVE BEEN CRYING: ONLY OCCASIONALLY
THE THOUGHT OF HARMING MYSELF HAS OCCURRED TO ME: NEVER

## 2024-10-16 NOTE — PATIENT INSTRUCTIONS
"   Continue to offer Promise the breast if you like, when she is calm and slightly but not extremely hungry.  Make yourself comfortable, but you do not need to make it a complex process--it's ok to just sit down and see if it works.  Use the nipple shield if it is helpful.    Given breastfeeding challenges, although some families pursue all avenues and some wean to formula entirely, there are many other options for coping. These can include decreasing pumping efforts and using formula for supplementation, setting a time boundary on continuing to \"triple feed,\" or moving to exclusive pumping.  Breastfeeding does not need to be a black-and-white choice, and you can consider what works best for your family.     Right now Promise needs about 21 - 22 oz of milk each day to grow well, so this is all the amount that you need to pump;  this is 2.5 - 3 oz each pumping session if you pump 8 times/day.  You do not need to pump more than she needs; this encourages oversupply and also creates more effort and time spent for you.  Continue pumping as you have been to have this extra milk to offer to Promise and promote strong milk supply.  If the 21mm flange is comfortable, continue to use that.  If you continue exclusive pumping for some time, consider changing the soft pliable parts of the pump setup (the valves and duckbills) every month or two, to maintain good function.  Consider storing milk in amounts that Promise will normally take, and a few others in smaller amounts (like 1 ounce) so you can have some to offer in a smaller \"top off\" amount without wasting larger quantities of milk.    Remember that babies are about a month old, they need about 25-30 oz/day (or 3- 4 oz each feeding if they eat about 8 times/day) and this where their needs stay for their entire first year;  you don't need to keep producing more and more milk as they grow.    Many babies have frequent spitting up:  This is because the valve between their " "stomach and esophagus is a bit loose.  It is not caused by inappropriate feeding or burping patterns.  Even if it looks like large amounts, it is not a health problem to be concerned with unless your baby is not gaining weight well, or is extremely fussy and inconsolable most of the time.  If your baby is periodically content and gaining weight well, spitting up is more of a laundry problem than a health problem.   Follow up with lactation as needed, and pediatric provider as planned in 2 weeks.  [employer] can be used for brief questions, but it's important to know that messages are not seen Friday through Sunday. If urgent help is needed, Monday through Friday you can call [phone] and one of our lactation consultants will get the message and respond; if you need a rapid response over a weekend or holiday, it is best to call your on-call maternity or pediatric provider.  Please feel free to schedule a return visit if the concern is more detailed;  telephone visits are also an option if you don't feel you need to be seen in person.   _______________________      Coaxing your baby back to the breast:  Use supportive positioning, and offer the breast when baby is calm.    Offer the breast for about 10 minutes or so. If baby is getting upset or crying, stop offering. Keep breastfeeding nurturing and positive. If either you or the baby is getting frustrated, consider taking a break and trying another time.  Bait and switch: if baby is very hungry they may not be interested in latching. Sometimes offering a small supplement before latching can take the edge off and then offering the breast.  Offer frequent \"whim\" breastfeeding, in a casual way.   Hold baby skin-to-skin often. Sometimes holding skin to skin can allow you to catch early feeding cues.   Consider co-bathing by relaxing in a warm bathtub while holding your baby skin to skin, they may be interested in latching.  (Do this with another adult present in the " house--babies are slippery!)  Consider offering the breast as baby is just waking or in a drowsy state.     ___________________          Safe Breastmilk Storage   Milk can be left out on the counter for 4 hours, stored in the fridge for 5 days, in the freezer (separate door from the fridge) for 3-6 months, and in a deep freeze for 6-12 months.      CDC recommendations on breastmilk storage  Https://www.cdc.gov/breastfeeding/recommendations/handling_breastmilk.htm    When mixing milk from separate pumping sessions, do not pour warm milk in with already-chilled milk from the refrigerator.  Put the fresher milk in the refrigerator until it is cold, and then you can combine the two, once they are the same temperature. Keeping milk in 2-4 oz amounts is good, as  babies rarely take more than 5 oz at a feeding, even in the second half of the first year.  When warming milk, warm the bottle in a little bowl or cup of hot water;  Don't use the microwave.  It breaks down some of the proteins, and parts of the milk can get too hot.    Pump and Bottle Cleaning  Clean the pump after every time you use it, and sanitize it once a day. Cleaning means to remove any milk residue or debris;  sanitizing means to heat the items to a temperature that actually kills any germs on the items so they do not make you or your baby sick.  You cannot clean AND sanitize in one step- they are two different processes and are both important.  Cleaning:  Wash hands prior to cleaning your pump/bottles/baby feeding items.  Separate all components of supplies.  Rinse supplies under running water.   Fill wash basin with hot water and add dish soap and place all items in a clean basin or container used only to clean infant feeding items.   Scrub items using a clean brush (like a bottle brush) that is used only to clean infant feeding items.  Squeeze water through nipple holes to be sure they get cleaned.    Rinse again under running water.  Clean  "wash basin and cleaning brush after each use.  Allow to air-dry: Do not rub or pat items dry with a towel.  Doing so may transfer germs to the items.  A clean towel is fine as a drying surface.  Inspect supplies:   o   Nipples, nipple shields, and pacifiers should be clear, never cloudy.   o   Replace if supplies remain cloudy after proper cleaning.    For sanitizing, you do not need to boil the parts.     Use a  set on \"sanitize.\"  If you put the small parts in the , make sure they are well-contained so they won't drop down into the bottom of the  and get lost or melted!  Use bags or containers that are sold for use in a [employer], following the directions that come with the items       _____________    You can store your pumped milk in individual containers, or you can store 24 hours worth of milk together in a clean jar or pitcher.  Keep the pooled milk in the fridge for not more than 24 hours before dividing up into \"servings.\"  Milk can be safely kept in the fridge for 4 days, so you do not need to put it all in the freezer, but just keep out what you will use soon.  Before combining milk, have all of it at the same temperature--that is, chill your freshly-pumped milk before pouring into your storage container.    "

## 2024-11-13 ENCOUNTER — TRANSFERRED RECORDS (OUTPATIENT)
Dept: MULTI SPECIALTY CLINIC | Facility: CLINIC | Age: 34
End: 2024-11-13

## 2024-11-13 LAB — PAP SMEAR - HIM PATIENT REPORTED: NORMAL

## 2025-02-27 NOTE — PROGRESS NOTES
"Date: 2019 23:09:32  Clinician: Maryan Cardenas  Clinician NPI: [id number]  Patient: Luba Hill  Patient : 1990  Patient Address: [address] [place] [address]  Patient Phone: [phone]  Visit Protocol: General skin conditions  Patient Summary:  Luba is a 29 year old ( : 1990 ) female who initiated a Visit for evaluation of an unspecified skin condition. When asked the question \"Please sign me up to receive news, health information and promotions from [employer].\", Luba responded \"Yes\".    Images of her skin condition were uploaded.  Her symptoms started 4-6 days ago and affect both sides of her body. The skin condition is located on her feet. The skin condition is white in color.    The skin condition has not changed since the symptoms started.   Denied symptoms include blisters, hives, tender to touch, scabs, pimples, pain, numbness, dry/flaky skin, sores, burning, itchiness, warm to touch, drainage, and crusts. Luba does not feel feverish. She does not have a rash in the shape of a bull's-eye.   Luba has not tried anything to relieve her symptoms.   Precipitating events   Luba did not come in contact with any irritants prior to the onset of her symptoms and has not been in close contact with anyone that has similar symptoms. She also did not spend time in a wooded area, swim, travel, or spend excess time in the sun just before her symptoms started. Luba did not get bitten or stung by an insect.   Pertinent medical history  Luba has not experienced this skin condition before.   Luba has had chickenpox and has had shingles in the past.    Luba does not have a history of atopia. Ongoing medical conditions were denied.   Weight: 175 lbs   Luba does not smoke or use smokeless tobacco.   She denies pregnancy and denies breastfeeding. She has menstruated in the past month.   Additional information as reported by the patient (free text): I have what I believe is a " fungal infection to both my big toenails. No pain, no burning, no itching. But the nails are both yellow colored, with white patches and are very brittle/breaking. This is a new issue but I am not sure exactly when it started as I typically have my nails painted. I have never had a toenail fungus before, but my dad has it in his toenails so I know how it typically looks   Weight: 175 lbs    MEDICATIONS: No current medications, ALLERGIES: NKDA  Clinician Response:  Dear Luba,   Your health is our priority. To determine the most appropriate care for you, I would like you to be seen in person to further discuss your health history and symptoms.  You will not be charged for this Visit. Thank you for trusting us with your care.   Diagnosis: Refer for additional evaluation  Diagnosis ICD: R69  Additional Clinician Notes: We are not able to start you on medication for a toe nail fungus. It appears to be that based on your pictures. The medicine used to treat this infection is very strong and you will need to have your liver checked.  so i recommend you follow up with your pcp to start treatment. in the meanwhile you can look up some home remedies for toe nail fungus. they won't take it all away but can be helpful to start as toe nail fungus takes a long time to treat.   No

## 2025-03-22 ENCOUNTER — HOSPITAL ENCOUNTER (EMERGENCY)
Facility: CLINIC | Age: 35
Discharge: HOME | End: 2025-03-22
Attending: EMERGENCY MEDICINE | Admitting: EMERGENCY MEDICINE
Payer: COMMERCIAL

## 2025-03-22 ENCOUNTER — APPOINTMENT (OUTPATIENT)
Dept: ULTRASOUND IMAGING | Facility: CLINIC | Age: 35
End: 2025-03-22
Attending: EMERGENCY MEDICINE
Payer: COMMERCIAL

## 2025-03-22 VITALS
HEART RATE: 73 BPM | BODY MASS INDEX: 30.13 KG/M2 | RESPIRATION RATE: 16 BRPM | TEMPERATURE: 98.6 F | WEIGHT: 192 LBS | SYSTOLIC BLOOD PRESSURE: 131 MMHG | OXYGEN SATURATION: 98 % | HEIGHT: 67 IN | DIASTOLIC BLOOD PRESSURE: 89 MMHG

## 2025-03-22 DIAGNOSIS — M79.662 PAIN OF LEFT CALF: ICD-10-CM

## 2025-03-22 PROCEDURE — 99284 EMERGENCY DEPT VISIT MOD MDM: CPT | Mod: 25

## 2025-03-22 PROCEDURE — 93971 EXTREMITY STUDY: CPT | Mod: LT

## 2025-03-22 ASSESSMENT — COLUMBIA-SUICIDE SEVERITY RATING SCALE - C-SSRS
2. HAVE YOU ACTUALLY HAD ANY THOUGHTS OF KILLING YOURSELF IN THE PAST MONTH?: NO
1. IN THE PAST MONTH, HAVE YOU WISHED YOU WERE DEAD OR WISHED YOU COULD GO TO SLEEP AND NOT WAKE UP?: NO
6. HAVE YOU EVER DONE ANYTHING, STARTED TO DO ANYTHING, OR PREPARED TO DO ANYTHING TO END YOUR LIFE?: NO

## 2025-03-22 ASSESSMENT — ACTIVITIES OF DAILY LIVING (ADL): ADLS_ACUITY_SCORE: 50

## 2025-03-23 NOTE — ED PROVIDER NOTES
"  Emergency Department Note      History of Present Illness     Chief Complaint   Leg Swelling      HPI   Luba Hill is a 35 year old female with a history of PCOS who presents to the Emergency Department for leg swelling. The patient reports she noticed a lump on her left calf today that she notes is painful, swollen, and associated with tingling/numbness in her lower leg. Denies shortness of breath, back pain, or chest pain.     Independent Historian   None    Review of External Notes   I reviewed the patient's 10/2/24 discharge note in which she gave birth via .       Past Medical History     Medical History and Problem List   PCOS    Medications   The patient is not currently taking any perscribed medications.     Physical Exam     Patient Vitals for the past 24 hrs:   BP Temp Temp src Pulse Resp SpO2 Height Weight   25 2139 131/89 -- -- 73 16 98 % -- --   25 (!) 141/96 98.6  F (37  C) Temporal 84 18 98 % 1.702 m (5' 7\") 87.1 kg (192 lb)     Physical Exam  General: Alert, appears well-developed and well-nourished. Cooperative.     In mild distress  HEENT:  Head:  Atraumatic  Ears:  External ears are normal  Mouth/Throat:  Oropharynx is without erythema or exudate and mucous membranes are moist.   Eyes:   Conjunctivae normal and EOM are normal. No scleral icterus.  CV:  Normal rate, regular rhythm, normal heart sounds and radial pulses are 2+ and symmetric.  No murmur.  Resp:  Breath sounds are clear bilaterally    Non-labored, no retractions or accessory muscle use  GI:  Abdomen is soft, no distension, no tenderness. No rebound or guarding.  No CVA tenderness bilaterally  MS:  Normal range of motion. No edema.    Back atraumatic.    Left Hip:    There is normal ROM of the hip    Flexion and Extension of the hip is normal    There is no evidence of clinical dislocation    There is no hematoma or obvious bursitis    The femur appears normal and without pain    There is no hematoma to " the thigh    Sensory and Motor exam to the thigh and distal leg are normal    The knee, shin, ankle, and foot are without pain    Normal distal pulses are detected    Calf mildly tender to palpation of posterior soft tissues.  No superficial thrombophlebitis.  No cellulitis.      Skin:  Warm and dry.    Neuro:   Alert. Normal strength.  Sensation intact in all 4 extremities. GCS: 15  Psych: Normal mood and affect.    Diagnostics     Lab Results   Labs Ordered and Resulted from Time of ED Arrival to Time of ED Departure - No data to display    Imaging   US Lower Extremity Venous Duplex Left   Final Result   IMPRESSION:      No deep venous thrombosis in the visualized left lower extremity.             EKG   None    Independent Interpretation   US Lower Extremity Venous Duplex Left: No DVT.    ED Course      Medications Administered   Medications - No data to display    Procedures   None     Discussion of Management   None    ED Course   ED Course as of 03/22/25 2143   Sat Mar 22, 2025   2132 I evaluated the patient, obtained history, and performed a physical exam as detailed above. The patient is safe for discharge.        Additional Documentation  None    Medical Decision Making / Diagnosis     CMS Diagnoses: None    MIPS       None    MDM   Luba Hill is a 35 year old female who presents for evaluation of left leg swelling. A broad differential was considered including Baker's cyst rupture, Baker's cyst, hematoma, rupture, compartment syndrome, muscle rupture, DVT, superficial thrombophlebitis, compression of the venous structures higher up in the abdomen and or leg cellulitis/ abscess, etc. Ultrasound is negative. There are no signs of compartment syndrome or other worrisome etiologies at this point so outpatient management is indicated.  I doubt more central causes of their swelling like renal failure, chf, liver disease, etc. They will elevate leg, do gentle dorsal flexion and plantar flexion motions,  take Tylenol for pain, and avoid strenuous activity. They should followup with her primary care doctor.  She is perhaps at increased risk now of a DVT.  Consider outpatient repeat ultrasound in 1 week if symptoms are persistent.  Will trial conservative management with Tylenol, ibuprofen, and lidocaine patches as needed.  After all questions answered and return precautions understood, discharged home.    Disposition   The patient was discharged.     Diagnosis     ICD-10-CM    1. Pain of left calf  M79.662            Discharge Medications   Discharge Medication List as of 3/22/2025  9:39 PM        Scribe Disclosure:  I, Deena Cruz, am serving as a scribe at 9:32 PM on 3/22/2025 to document services personally performed by Niall Chaves MD based on my observations and the provider's statements to me.        Niall Chaves MD  03/22/25 [id number]

## 2025-03-23 NOTE — ED TRIAGE NOTES
Arrives with concerns for blood clot left lower leg.  States was getting a massage and therapist noticed lump in left calf and swelling.  Patient had a flight 3/8 and had a child 5 months ago.      Triage Assessment (Adult)       Row Name 03/22/25 2009          Triage Assessment    Airway WDL WDL        Respiratory WDL    Respiratory WDL WDL        Skin Circulation/Temperature WDL    Skin Circulation/Temperature WDL WDL        Cardiac WDL    Cardiac WDL WDL        Peripheral/Neurovascular WDL    Peripheral Neurovascular WDL X  left lower leg numbness and lump in calf.        Cognitive/Neuro/Behavioral WDL    Cognitive/Neuro/Behavioral WDL WDL

## 2025-03-28 PROBLEM — E28.2 PCOS (POLYCYSTIC OVARIAN SYNDROME): Status: ACTIVE | Noted: 2017-01-01
